# Patient Record
Sex: FEMALE | Race: WHITE
[De-identification: names, ages, dates, MRNs, and addresses within clinical notes are randomized per-mention and may not be internally consistent; named-entity substitution may affect disease eponyms.]

---

## 2018-05-29 NOTE — NUR
RN INITIAL NOTES



RECEIVED PATIENT FROM ER VIA PATIENT'S WHEELCHAIR. PATIENT IS A/OX3, ABLE TO MAKE NEEDS 
KNOWN. DIAGNOSIS OF BLE CELLULITIS. NO ACUTE DISTRESS, NO SOB NOTED. ON ROOM AIR, TOLERATED 
WELL. IV SITE INTACT AND PATENT. REFUSED TO DO SKIN ASSESSMENT AT THE MOMENT, SHE SAID 
"LATER". PATIENT REFUSED TO LAY ON THE BED, PER PATIENT SHE'S ON THE WHEELCHAIR MOST OF THE 
DAY AND ONLY TRANSFER TO BED DURING AT NIGHT. WILL NOTIFY DIONE GONZALEZ NP FOR ADMITTING 
ORDERS. WILL CONTINUE TO MONITOR ACCORDINGLY.

## 2018-05-29 NOTE — NUR
RN NOTES

RECEIVED PATIENT UP IN WHEELCHAIR. AO X 3, ABLE TO MAKE NEEDS KNOWN. NO ACUTE DISTRESS 
NOTED. MONITORED FOR PAIN. IV SITE PATENT, INTACT; FLUSHED. SAFETY REMINDERS GIVEN. CALL 
BELL WITHIN EASY REACH. WILL CONTINUE TO MONITOR.

## 2018-05-29 NOTE — NUR
RN CLOSING NOTES



PATIENT IN STABLE CONDITION. ALL NEEDS ATTENDED AND PROVIDED. KEPT PATIENT SAFE. PATIENT 
REFUSED TO LAY ON BED. PATIENT NOTED SITTING ON WHEELCHAIR UPON CHANGE OF SHIFT. ENDORSED TO 
NIGHT RN FOR BRONWYN.

## 2018-05-29 NOTE — NUR
RN NOTES



PATIENT KEPT ASKING FOR A SMOKE. PER PATIENT, DIONE GONZALEZ NP SAID SHE CAN SMOKE.

CONSENT FOR SMOKE POLICY SIGNED.

WENT DOWN FOR A SMOKE WITH CORKY ANGEL.

## 2018-05-30 NOTE — NUR
RN CLOSING NOTES



PT IS STABLE AND RESTING AT BEDSIDE IN WHEELCHAIR. A/OX3. NO S/S OF RESP DISTRESS OR SOB. PT 
WITH CONTINUED PAIN MANAGEMENT.  IV ACCESS PATENT AND INTACT NO REDNESS OR INFILTRATION 
NOTED, CURRENTLY SL. SAFETY MEASURES IN PLACE, CALL LIGHT WITHIN REACH. WILL CONTINUE TO 
MONITOR AND ENDORSE TO NEXT SHIFT FOR CONTINUITY OF CARE

## 2018-05-30 NOTE — NUR
WOUND CARE CONSULT: PT PRESENTS WITH RASH TO PERINEUM AND LOWER BUTTOCKS, AS WELL AS REDNESS 
WITH WEEPING EDEMA TO LOWER LEGS WITH REDNESS, PRESENT ON ADMISSION. ALL SKIN PROTECTION 
RECOMMENDATIONS DISCUSSED WITH NURSING STAFF. WILL SEE PRN. MD IN AGREEMENT WITH PLAN OF 
CARE. 

-------------------------------------------------------------------------------

Addendum: 05/30/18 at 1211 by TIP JOSEPH WNDNU

-------------------------------------------------------------------------------

Amended: Links added.

## 2018-05-30 NOTE — NUR
PATIENT REFUSED TO GO TO BED. PER PATIENT, SHE IS MORE COMFORTABLE IN WHEELCHAIR; SHE 
USUALLY RESTS IN WHEELCHAIR WHEN IN SNF. WILL CONTINUE TO MONITOR.

## 2018-05-30 NOTE — NUR
RN NOTES

PATIENT ASLEEP, EASILY AROUSABLE. RESPIRATIONS EVEN. NO SIGNS OF PAIN NOTED. DUE MEDS GIVEN 
WITH NO ASE NOTED. NEEDS ATTENDED. KEPT CLEAN, DRY, AND COMFORTABLE. SAFETY PRECAUTIONS AND 
COMFORT MEASURES IN PLACE. WILL GIVE REPORT TO DAY SHIFT FOR CONTINUITY OF CARE.

## 2018-05-30 NOTE — NUR
RN OPENING NOTES



RECEIVED PT. PT IS STABLE AND RESTING BEDSIDE IN WHEELCHAIR. A/OX3. NO S/S OF RESP DISTRESS 
OR SOB. PT C/O PAIN 6/10 ORIGINATING FROM LUE. WILL ADDRESS PHARMACOLOGICALLY. IV ACCESS 
LOCATED ON RAC 22G, CURRENTLY SL. SAFETY MEASURES IN PLACE, CALL LIGHT WITHIN REACH. WILL 
CONTINUE TO MONITOR.

## 2018-05-30 NOTE — NUR
RECEIVED PATIENT UP IN WHEELCHAIR. AO X 3, ABLE TO MAKE NEEDS KNOWN. NO ACUTE DISTRESS 
NOTED. MONITORED FOR PAIN. IV SITE PATENT, INTACT; FLUSHED. SAFETY REMINDERS GIVEN. CALL 
BELL WITHIN EASY REACH. WILL CONTINUE TO MONITOR.

## 2018-05-31 NOTE — NUR
MS RN OPENING NOTE

PATIENT IS RESTING COMFORTABLY IN WHEELCHAIR AS REQUESTED. ALERT AND ORIENTED X4. NO FACIAL 
GRIMACING NOTED FOR PAIN. NO SOB OR DISTRESS NOTED. CALL LIGHT WITHIN REACH. SAFETY MEASURES 
IMPLEMENTED. ABLE TO COMMUNICATE NEEDS. IV INTACT AND PATENT NO REDNESS OR SWELLING NOTED, 
NO FLUIDS AT THIS TIME. WILL CONTINUE TO MONITOR THROUGHOUT SHIFT.

## 2018-05-31 NOTE — NUR
MS RN NOTE

PATIENT STATED SHE IS VERY ANXIOUS ABOUT NOT SMOKING, SHE SAID " THIS ISN'T FAIR, I JUST 
WANT TO SMOKE". PATIENT SEEN CRYING AND MOANING. ATIVAN 1MG PO GIVEN. WILL CONTINUE TO 
MONITOR PATIENT

## 2018-05-31 NOTE — NUR
MS RN NOTE

ATIVAN 1 MG EFFECTIVE. PATIENT RESTING AT THIS TIME. NO ANXIOUSNESS NOTED, WILL CONTINUE TO 
MONITOR THROUGHOUT SHIFT

## 2018-05-31 NOTE — NUR
MS RN NOTE

PATIENT REQUESTED PAIN MEDICATION FOR PAIN 10/10 ON BILATERAL LOWER EXTREMITIES. NORCO 
10/325 PO GIVEN. WILL REASSESS PAIN

## 2018-05-31 NOTE — NUR
PATIENT AWAKE; TRANSFERRED TO WHEELCHAIR AS REQUESTED BY PATIENT. RESPIRATIONS EVEN. NO 
SIGNS OF PAIN NOTED. DUE MEDS GIVEN WITH NO ASE NOTED. NEEDS ATTENDED. KEPT CLEAN, DRY, AND 
COMFORTABLE. SAFETY PRECAUTIONS AND COMFORT MEASURES IN PLACE. WILL GIVE REPORT TO DAY SHIFT 
FOR CONTINUITY OF CARE.

## 2018-05-31 NOTE — NUR
MS RN CLOSING NOTE

PATIENT IS RESTING IN WHEELCHAIR AT THIS TIME WITH BRAKES ON. NO PAIN AT THIS TIME. NO SOB 
OR DISTRESS NOTED. CALL LIGHT WITHIN REACH AT ALL TIMES. SAFETY MEASURES IMPLEMENTED. ABLE 
TO COMMUNICATE NEEDS. IV INTACT AND PATENT NO REDNESS OR SWELLING NOTED. ALL DUE MEDICATIONS 
GIVEN AS ORDERED. ALL NURSING CARE NEEDS ATTENDED TO. WOUND TREATMENT DONE THROUGHOUT SHIFT. 
WILL ENDORSE TO NIGHT SHIFT FOR BRONWYN

## 2018-05-31 NOTE — NUR
MS RN INITIAL NOTE 



PT IS RESTING COMFORTABLY IN THE WHEELCHAIR, PT DOES NOT TO WANT TO BE IN THE BED. A/O X4 
ABLE TO MAKE NEEDS KNOW, PT IS REQUESTING A SMOKE BREAK. REMINDED PT THAT SHE'S ALLOWED ONE 
SMOKE BREAK PER SHIFT. NO SIGNS OF SOB OR DISTRESS, BREATHING EVENLY AND UNLABORED ON RA. IV 
ACCESS IS INTACT AND PATENT. WHEELCHAIR IS IN LOCKED POSITION. WILL CONTINUE TO MONITOR PT

## 2018-06-01 NOTE — NUR
RN NOTES

PATIENT REFUSED TO GO BACK TO BED THROUGHOUT THE SHIFT, PREFERS TO STAY IN THE WHEELCHAIR. 
PATIENT A/OX3, NONCOMPLIANT. RECEIVED DISCHARGE INSTRUCTIONS AND VERBALIZED UNDERSTANDING, 
PATIENT SIGNED DISCHARGE PAPERWORKS. PATIENT ASSISTED WITH ADLS, SKIN CARE RENDERED. PATIENT 
STILL REFUSING TO HAVE PHOTOS OF APOLINAR-AREA. PATIENT IS IN NO DISTRESS, ALL DUE MEDICATIONS 
GIVEN. WAITING FOR TRANSPORTATION AT THIS TIME.

## 2018-06-01 NOTE — NUR
RN NOTES

PATIENTS SKIN ASSESSMENT DONE, PHOTOS TAKEN AND PLACED IN CHART, EXCEPT FOR APOLINAR-AREA PHOTO, 
PATIENT IS REFUSING TO GO BACK TO BED AND TO HAVE PHOTO TAKEN OF HER APOLINAR-AREA WHERE REDNESS 
ARE MORE VISIBLE. ONLY PART OF SACRAL WERE TAKEN. EXPLAINED RISKS AND BENEFITS, STILL 
REFUSED. PER PATIENT, "I CANNOT GO TO BED"

## 2018-06-01 NOTE — NUR
RN DISCHARGE

PATIENT'S SKIN CARE RENDERED, PATIENT FINALLY ALLOWED TO HAVE PHOTOS TAKEN ON HER APOLINAR-AREA, 
STILL NOTED WITH REDNESS AND RASHES. PATIENT RECEIVED DISCHARGE INSTRUCTIONS AND VERBALIZED 
UNDERSTANDING, SIGNED DISCHARGED PAPERWORKS. BELONGINGS RECONCILED. PIV LEFT IN PLACED D/T 
PATIENT CONTINUING THE ANTIBIOTICS AT SNF. PATIENT'S NEEDS ATTENDED, WOUND TREATMENT 
RENDERED, PATIENT LEFT VIA GURNEY IN NO DISTRESS. ATTEMPTED TO GIVE REPORT TO [facility] BUT NO ANSWER AT THIS TIME.

## 2018-06-01 NOTE — NUR
RN NOTES

CALLED [facility] TWICE, WAS PLACED ON HOLD TWICE FOR 30 MINUTES EACH TIME. AND 
ANOTHER ATTEMPT TWICE, WITH NO ANSWER AT ALL, JUST AN AUTOMATED VOICE. ERICH  
MADE AWARE.

## 2018-08-09 NOTE — NUR
BIB [facility] FOR BILATERAL LOWER EXTREMITY EDEMA AND REDNESS. NAD WILL CONT 
TO MONITOR.PT REFUSED TO LAY IN THE BED. normal...

## 2019-04-19 NOTE — NUR
PT FERNANDA TAVERAS FROM [facility] FOR "AMS X 2 DAYS" PT AO TO NAME PERIODS OF 
CONFUSION, PER PA PT HAS RECENT HX OF UTI AND NOT COMPLIANT WITH MEDICATION. RR 
EVEN AND UNLABORED. NO NVD AT THIS TIME. PT NOTED WITH LOWER LEG REDNESS. DR. ORTIZ AT BEDSIDE FOR EVAL

## 2019-04-20 NOTE — NUR
RN NOTES





SPOKE TO DR SNOW PRESENT ON THE UNIT. INFORMED PT CAME LAST NIGHT ON TELE AND RN RECEIVED 
PT ON NPO THEN EARLIER AROUNG 0845, PT D/C TELE TO MS. RN VERIFIED PT'S DIET WITH MD. MD 
WILL SEE PT FIRST AND WILL PUT IN ORDERS. PER MD SK

## 2019-04-20 NOTE — NUR
MS RN CLOSING NOTES





PT REMAINS IN BED, AWAKE, INTERMITTENTLY CRYING. A/O X1. TOLERATING RA, WITH NO ACUTE 
RESPIRATORY DISTRESS NOTED. PT DENIES ANY PAIN. SEEN BY SK THIS MORNING WITH ORDERS BUT PT 
REFUSED ECHOCARDIOGRAM AND COUPLE XRAYS, MD MADE AWARE. ALL NEEDS AND CARE PROVIDED. 
REPOSITIONING Q2 HOURS MADE.  RFA G20 SL, FLUSHED WITH NS, INTACT AND OPERATIONAL. PT KEPT 
COMFORTABLE.  PT'S BED IN LOWEST, LOCKED POSITION WITH SR X2. CALL LIGHT KEPT WITHIN REACH. 
WILL ENDORSE TO NIGHT NURSE FOR BRONWYN.

## 2019-04-20 NOTE — NUR
TELE RN CLOSING NOTES



Patient asleep at this time, on RA, no SOB/respiratory distress noted. Medicated for pain on 
L hip and BLE, noted effective. Patient was noted with good sleep throughout the shift. 
Remained on NPO except meds. Patient remained agitated and screaming when repositioned.  
With patent peripheral IV line RFA #20, SL. All nursing needs attended. Kept clean, dry and 
comfortable. Call light within easy reach. Afebrile the whole shift. Endorsed to the next 
shift. 

-------------------------------------------------------------------------------

Addendum: 04/20/19 at 0655 by SHAYY LIMON RN

-------------------------------------------------------------------------------

ON TELE WITH SR NOTED.

## 2019-04-20 NOTE — NUR
MS RN NOTES





SEEN BY DR. SNOW. PT AWARE OF SKIN ISSUES/BLE CELLULITIS. TO ORDER PODIATRY CONSULT WITH 
DR. WALLER. MD ANAYA TEXTED DR. WALLER AND NOTIFIED.

## 2019-04-20 NOTE — NUR
TELE RN ADMISSION NOTES



Received patient from ER via [facility] assisted by 2 ER staff. Admitted to Tele 323-1 due to 
AMS. Transferred patient to bed comfortably. Admission routine done. Initial skin assessment 
done, photos taken and documented. Kept patient clean, dry and comfortably. With patent 
peripheral IV line RFA G#20, SL. On RA, no SOB/respiratory distress noted. On tele monitor 
with SR noted. Patient denies chest pain, with complaint of pain on L hip and BLE. With 
cellulitis BLE, wound care done accordingly. Administered pain meds as ordered. Patient's 
current LOC is responsive, alert with episodes of forgetfulness and confusions. Mildly 
anxious. All nursing needs attend. Kept on NPO, expect meds as ordered. Assisted patient to 
her preferred comfortable position. Call light within easy reach. Will continue to monitor 
accordingly.

## 2019-04-20 NOTE — NUR
TELE RN OPENING NOTES





RECEIVED PT IN BED, AWAKE, INTERMITTENTLY CRYING. A/O X1. TOLERATING RA, WITH NO ACUTE 
RESPIRATORY DISTRESS NOTED. PT DENIES ANY PAIN. CONCERN WHEN CAN SHE TAKE FOOD/BREAKFAST; 
AWAITING FOR HOSPITALIST ROUNDS TO F/U DIET AND PLAN, PT MADE AWARE. PT ON TELEMONITORING 
NSR, HR 79. RFA G20 SL, FLUSHED WITH NS, INTACT AND OPERATIONAL. PT KEPT COMFORTABLE. PT'S 
BED IN LOWEST, LOCKED POSITION WITH SR X2. CALL LIGHT KEPT WITHIN REACH. WILL CONTINUE PLAN 
OF CARE.

## 2019-04-20 NOTE — NUR
MS RN OPENING NOTES



Patient asleep with HOB elevated. On RA, no SOB/respiratory distress noted. With patent 
peripheral IV line RFA G# 20 SL. Patient noted A/O x 1-2 with confusion and forgetfulness 
noted. Patient easily agitated and screaming. Patient preferred to be in R lateral position, 
with complaint of L hip pain aggravated by movement. With BLE cellulitis, swelling decreased 
compared to admission. On IV ATB as ordered. Medicated for pain as ordered. Kept bed low and 
locked, siderails x2 up, call light within easy reach. Will continue to monitor accordingly.

## 2019-04-21 NOTE — NUR
MS RN OPENING NOTES





RECEIVED PT IN BED, INTERMITTENTLY DOZING OFF. A/O X1. TOLERATING RA, WITH NO ACUTE 
RESPIRATORY DISTRESS NOTED. PT DENIES ANY PAIN AT THIS MOMENT. NIGHT NURSE ENDORSED PT 
DIDN'T HAD ENOUGHT SLEEP LAST NIGHT AND MOSTLY AWAKE IN HIS SHIFT, PT JUST FELL ASLEEP FEW 
HOURS AGO. PIV TO RFA G20 SL, FLUSHED WITH NS, INTACT AND OPERATIONAL. PT KEPT COMFORTABLE. 
PT'S BED IN LOWEST, LOCKED POSITION WITH SR X2. CALL LIGHT KEPT WITHIN REACH. WILL CONTINUE 
PLAN OF CARE.

## 2019-04-21 NOTE — NUR
RN MS OPENING NOTES

RECEIVED PATIENT IN BED AWAKE, ALERT AND ORIENTED X1, CONFUSED. VERBALLY RESPONSIVE. GETS 
AGITATED QUICKLY WHEN ASKED QUESTIONS. NONAMBULATORY.  BREATHING EVEN AND UNLABORED. NO SOB 
NOTED. TOLERATING ROOM AIR. NO COMPLAINTS OF PAIN OR DISCOMFORT. NO FACIAL GRIMACING. IV ON 
RIGHT FOREARM INTACT AND PATENT. SKIN DRY AND WARM TO TOUCH. NOTED WITH RIGHT HAND AND BLE 
EDEMA. AFEBRILE. ALL OTHER NEEDS MET. SAFETY MEASURES IN PLACE. CALL LIGHT WITHIN REACH. 
WILL CONTINUE TO MONITOR.

## 2019-04-21 NOTE — NUR
MS RN CLOSING NOTES



Patient noted asleep on bed at this time, BLE elevated to comfortable position. On RA, no 
SOB/respiratory distress noted. Afebrile the whole shift. Medicated for pain as ordered, 
noted effective. All due meds given as ordered, no ASE noted. All nursing needs attended. 
Kept patient clean, dry and comfortable at all times, snacks provided as requested. Kept bed 
low and locked, siderails x2 up, call light within easy reach. Endorsed to the next shift.

## 2019-04-21 NOTE — NUR
RN NOTES





PT SEEN AND EVALUATED BY DR. SCHROEDER/ PSYCH CONSULT. MODIFIED RISPERDAL AND GABAPENTIN 
ORDERS. WILL CONTINUE TO MONITOR.

## 2019-04-21 NOTE — NUR
MS RN CLOSING NOTES



PT REMAINS IN BED, INTERMITTENTLY DOZING OFF. A/O X1. TOLERATING RA, WITH NO ACUTE 
RESPIRATORY DISTRESS NOTED. PT HAD GENERALIZED PAIN AND JUST REQUESTED FOR NORCO, PRN GIVEN 
AS ORDERED. HOB KEPT ELEVATED. PIV TO RFA G20 SL, FLUSHED WITH NS, INTACT AND OPERATIONAL.  
ALL NEEDS AND CARE PROVIDED AND ATTENDED. PT REFUSED FOR WOUND DRESSINGS TO BE CHANGED THIS 
MORNING. MD ANAYA AWARE AND PRESENT BEDSIDE AS WELL. PT KEPT COMFORTABLE. PT'S BED IN LOWEST, 
LOCKED POSITION WITH SR X2. CALL LIGHT KEPT WITHIN REACH. WILL ENDORSE TO NIGHT SHIFT NURSE 
FOR BRONWYN.

## 2019-04-21 NOTE — NUR
RN MS NOTES 

PATIENT'S RIGHT HAND/ARM NOTED WITH NONPITTING EDEMA. WRAPPED IN ACE BANDAGE. PATIENT ALSO 
HAS IV ON THE SAME LOCATION. IV SITE IS FLUSHING WELL BUT ATTEMPTED TO CHECK TO SEE IN 
THERE'S ANY INFILTRATION. PATIENT REFUSED TO BE TOUCHED. INFORMED PATIENT THAT I WOULD JUST 
LIKE TO MAKE SURE IT'S WORKING. PER PATIENT "IT WORKS. I DONT NEED IT CHECKED. GO AWAY". 
WILL CHECK AGAIN ANOTHER TIME. WILL CONTINUE TO MONITOR FOR NOW.

## 2019-04-21 NOTE — NUR
RN MS NOTES

PATIENT REFUSED TO HAVE VITAL SIGNS TAKEN. RN AND CNA IN ROOM AND ENCOURAGING PATIENT TO 
HAVE VITALS TAKEN FOR MONITORING. PATIENT KEPT YELLING, AND TELLING RN AND CNA NOT TO TOUCH 
HER AND TO LEAVE THE ROOM. PATIENT ALSO KEEPS PRESSING CALL LIGHT AND SAYING "AMBULANCE." 
REORIENTED THE PATIENT MULTIPLE TIMES THAT SHE'S IN THE HOSPITAL BUT STILL DOESN'T STOP. RN 
AND CNA LEFT THE ROOM. PATIENT STOPPED YELLING. WILL CONTINUE TO MONITOR CLOSELY.

## 2019-04-22 NOTE — NUR
RN MS CLOSING NOTES

PATIENT IN BED. STILL YELLING OUT FOR PARAMEDICS. REMAINS CONFUSED. REORIENTED MULTIPLE 
TIMES BUT DID NOT WORK . GETS AGITATED ASKED QUESTIONS. OTHERWISE NO ACUTE CHANGES 
THROUGHOUT SHIFT. BREATHING EVEN AND UNLABORED. NO SOB NOTED. TOLERATING ROOM AIR. NO 
COMPLAINTS OF PAIN OR DISCOMFORT. NO FACIAL GRIMACING. IV ON RIGHT FOREARM INTACT. SKIN DRY 
AND WARM TO TOUCH. AFEBRILE. ALL OTHER NEEDS MET. KEPT CLEAN AND DRY. MULTIPLE ATTEMPTS TO 
REPOSITION THROUGHOUT NIGHT BUT REFUSED EVERY TIME. SAFETY MEASURES IN PLACE. CALL LIGHT 
WITHIN REACH. WILL ENDORSE TO ONCOMING NURSE FOR BRONWYN.

## 2019-04-22 NOTE — NUR
RN MS NOTES

PATIENT CONSISTENTLY PRESSES CALL [facility] AND ASKS FOR "PARAMEDICS." REORIENTED PATIENT 
MANY TIMES BUT DOES NOT WORK. PATIENT KEEPS STATING THAT SHES UNCOMFORTABLE BUT REFUSES TO 
BE TOUCHED AND REPOSITIONED. PATIENT REQUESTED FOR TYLENOL AND TYLENOL WAS GIVEN BUT FORGETS 
THAT SHE DRANK IT. WHENEVER RN GOES IN THE ROOM, PATIENT KEEPS SAYING TO "GET OUT" OR "I 
DONT LIKE YOU." TRIED TO HAVE A DIFFERENT RN BUT SAYS THE SAME THING. WILL CONTINUE TO 
REORIENT AS MUCH AS POSSIBLE.

## 2019-04-22 NOTE — NUR
RN MS NOTES

PATIENT REFUSED TO HAVE BLOOD DRAWN DESPITE EXPLANATION OF RISKS AND BENEFITS. PATIENT IS 
CONFUSED AND KEEPS CALLING FOR "PARAMEDICS" TO PICK HER UP.

## 2019-04-22 NOTE — NUR
MS RN OPENING NOTES



RECEIVED PT AWAKE IN BED WATCHING TV. HOB ELEVATED. A/O X 2-3. ABLE TO VERBALIZE NEEDS, 
DENIES PAIN OR DISCOMFORTS AT THIS TIME. CONFUSED AND EASILY GETS AGITATED. REORIENTED. ON 
ROOM AIR, BREATHING EVEN AND UNLABORED, NO SOB NOTED. IV ACCESS ON RIGHT FA G#20 INTACT AND 
PATENT. SAFETY MEASURES IN PLACE. BED IN LOW LOCKED POSITION WITH SR UP X2. CALL LIGHT IN 
REACH. WILL CONTINUE TO MONITOR PT ACCORDINGLY.

## 2019-04-22 NOTE — NUR
MSRN

FULLY AWAKE, CONFUSED AS OF THIS TIME, COOPERATIVE, NO SOB.  DENIES ANY DISCOMFORTS AS OF 
THIS TIME, REPOSITIONED BOTH LOWER EXTREMITIES PER PATIENTS' COMFORT.  REALITY ORIENTAION, 
LISTENS ONLY FOR SHORT PERIOD OF TIME.  NEEDS CLOSER OBSERVATION, FREQUENT CHECKS.  NO OTHER 
NEEDS MADE, KEPT COMFORTABLE.

## 2019-04-22 NOTE — NUR
RN MS NOTES

PATIENT REFUSED TO HAVE IV SITE CHECKED AGAIN. EXPLAINED RISKS AND BENEFITS BUT STILL 
REFUSED. PATIENT DOES NOT WANT ME TO REMOVE ACE BANDAGE. 

WILL CONTINUE TO MONITOR.

## 2019-04-22 NOTE — NUR
MS RN CLOSING NOTES



PATIENT IN BED AWAKE AND WATCHING TV AT THIS TIME. HOB ELEVATED. A/O X 2-3. ABLE TO 
VERBALIZE NEEDS. PT NOTED WITH PERIODS OF CONFUSION AND AGITATION DURING THE DAY. REORIENTED 
AS NEEDED AND NEEDS ATTENDED WELL. ON ROOM AIR, BREATHING EVEN WITH NO SOB NOTED. IV ACCESS 
ON RIGHT FA G#20 INTACT AND PATENT, EASILY FLUSHED WITH NS. PT TURNED AND REPOSITIONED Q 
2HRS. KEPT CLEAN, DRY AND COMFORTABLE. ALL SAFETY MEASURES KEPT IN PLACE. BED IN LOW LOCKED 
POSITION WITH SR UP X2. CALL LIGHT IN REACH. WILL ENDORSE TO NIGHT SHIFT NURSE FOR BRONWYN.

## 2019-04-22 NOTE — NUR
RN NOTES



PATIENT COMPLAINED OF PAIN ON HER LEFT HIP AND REQUESTED FOR TYLENOL. PRN TYLENOL 650 MG PO 
ADMINISTERED AT 1555. WILL CONTINUE TO MONITOR PT.

## 2019-04-23 NOTE — NUR
RN NOTES

PATIENT A/OX1-2, EASILY AGITATED, BREATHING EVEN AND UNLABORED, BLE COVERED WITH KERLIX, 
C/D/I. PATIENT REFUSED TO HAVE FEET TOUCHED AT THIS TIME, WILL OFFER TO CHANGE DRESSING AT A 
LATER TIME. NEEDS ATTENDED, CALL LIGHT WITHIN REACH, WILL CONTINUE TO MONITOR.

## 2019-04-23 NOTE — NUR
RN NOTES

PATIENT REFUSING TO EAT AT THIS TIME AND TO TAKE HER MEDICATIONS. EXPLAINED RISKS AND 
BENEFITS, STILL REFUSED. VERBALIZED UNDERSTANDING.

## 2019-04-23 NOTE — NUR
RN DISCHARGE NOTE

PATIENT A/OX1-2, BREATHING EVEN AND UNLABORED, NO SOB NOTED, PATIENT ALLOWED SKIN CARE, 
HOWEVER STILL REFUSING PHOTOS. BUTTOCKS STILL NOTED WITH REDNESS, WOUND TREATMENT RENDERED. 
AMBULANCE CAME AND REPORT GIVEN, VSS. PIV REMOVED AND APPLIED GAUZE AND TAPE. BELONGINGS 
RECONCILED AND COMPLETE. PATIENT LEFT IN STABLE CONDITION VIA AMBULANCE. 

-------------------------------------------------------------------------------

Addendum: 04/23/19 at 1442 by MARIA LUISA MCCONNELL RN

-------------------------------------------------------------------------------

ADDENDUM: 

DISCHARGE INSTRUCTIONS PROVIDED, BUT PATIENT REFUSED TO SIGN, WITNESSED BY ANOTHER NURSE.

## 2019-04-23 NOTE — NUR
RN NOTES

REPORT GIVEN TO JENNIE EARL AT [facility], DISCHARGE INSTRUCTIONS PROVIDED AND VERBALIZED 
UNDERSTANDING. AMBULANCE PRESENT. WILL GIVE REPORT.

## 2019-04-23 NOTE — NUR
RN NOTES

PATIENT REFUSED TO HAVE SKIN ASSESSMENT AND PHOTOS TO BE TAKEN, PATIENT STATED "NO, YOU MAY 
NOT." OFFERED WOUND TREATMENT ON BLE, PATIENT ALSO REFUSED, PATIENT STATED, "DO NOT TOUCH MY 
LEGS" EXPLAINED RISKS AND BENEFITS, STILL REFUSED, PATIENT IS EASILY AGITATED.

## 2019-04-23 NOTE — NUR
WOUND CARE CONSULT   WOUND CARE RECEIVED CONSULT FOR FOOT WOUNDS.  WOUND CARE WILL DEFER 
CONSULT AND ALL TREATMENT PLANS TO PLASTIC SURGICAL TEAM INCLUDING DPM DR WALLER WHO ARE 
ALL FOLLOWING THIS PATIENT.  PATIENT WITH MARY ANN AT 15, ALL PRESSURE ULCER PREVENTION 
MEASURES ARE NOTED TO BE IN PLACE.  WILL SEE PRN.

## 2020-01-15 NOTE — NUR
MS RN NOTE



LAB HERE TO DRAW BLOOD CULTURE, WILL ADMIN ROCEPHIN AFTER VERIFIED BY PHARMMyla CAMPBELL CHARGE 
MADE AWARE OF NEED FROM PYXIS.

## 2020-01-15 NOTE — NUR
MS RN NOTE



PT REFUSED BLOOD CX TO BE DRAWN, RISKS AND BENEFITS MADE AWARE. WILL CONT. TO MONITOR.

## 2020-01-15 NOTE — NUR
MS RN NOTE



PT ARRIVED TO FLOOR VIA [facility] ACCOMPANIED BY ER STAFF. PT A/O X4, NO SIGNS OF SOB OR 
DISTRESS, NO N/V. PT REFUSING BODY ASSESSMENT TO BE DONE, ALL BELONGINGS ACCOUNTED AND 
SIGNED FOR. NOTED WITH IV IN R HAND #20 S/L. ALL CURRENT NEEDS ATTENDED TO. BED LOW, LOCKED, 
UPPER RAILS UP, AND CALL LIGHT WITHIN REACH. WILL CONT. TO MONITOR. GONZALEZ AWARE OF PT 
ARRIVAL TO FLOOR.

## 2020-01-16 NOTE — NUR
MS RN NOTE 



CALLED ON CALL MD TO NOTIFY THAT THE PATIENT O2 SAT IS 87%- 88%. PATIENT IS CURRENTLY ON 
OXYGEN, VERY CONGESTED, SPUTUM IS CLEAR. HX OF COPD. MD TELEPHONE ORDERED STAT CXR AND STAT 
ABG. READ BACK, NOTED AND CARRIED OUT.

## 2020-01-16 NOTE — NUR
MS RN NOTES



PATIENT SEEN BY MANUELA MACK, INFORMED MANUELA THAT PATIENT HAS BEEN OFF OF DEXTROAMPHETAMINE 
SINCE NOV 1ST WITH ORDER FOR DC MED. CARRIED OUT.

## 2020-01-16 NOTE — NUR
MS RN NOTE



SPOKE WITH ANITA AT PHARMACY IN REGARDS TO ROCEPHIN ORDER NOT BEING VERIFIED, AWAITING 
VERIFICATION

## 2020-01-16 NOTE — NUR
MS RN NOTE



PT NOTED WITH LACTIC ACID OF 3.1, GONZALEZ NOTIFIED WITH NEW ORDER FOR  ML BOLUS, AND 
REDRAW OR LACTIC AFTER BOLUS GIVEN. NEW ORDER CARRIED OUT.

## 2020-01-16 NOTE — NUR
MS RN CLOSING NOTES



ALERT AND AWAKE ORIENTED X4. NO SOB. DENIES ANY C/O PAIN NOR DISCOMFORT AT THIS TIME. HOB 
ELEVATED. ON O2 VIA NC @ 2L/MIN DARIEN WELL. RIGHT HAND #20 INTACT AND PATENT. PATIENT NON 
COMPLIANT WITH CARE DESPITE OF RISKS AND BENEFITS EXPLAINED. BED IN LOWEST POSITION, LOCKED. 
BED ALARM ON. BED SIDERAILS UP X2. CALL LIGHT WITHIN REACH. IN NO APPARENT DISTRESS.

## 2020-01-16 NOTE — NUR
MS RN NOTE



PT REMAINS IN STABLE CONDITION, A/O X4, NO SIGNS OF SOB OR DISTRESS, NO N/V. PT ALLOWED 
PARTIAL BODY ASSESSMENT TO BE DONE PHOTOS TAKEN AS PATIENT ALLOWED. NOTED WITH IV IN R HAND 
#20 S/L. ALL CURRENT NEEDS ATTENDED TO. BED LOW, LOCKED, UPPER RAILS UP, AND CALL LIGHT 
WITHIN REACH. WILL CONT. TO MONITOR AND ENDORSE TO NEXT SHIFT FOR BRONWYN.

## 2020-01-16 NOTE — NUR
MS RN NOTE



PT REFUSED FOR PILLOWS TO BE TAKEN OFF OF LEGS. DAILY INACCURATE. WILL ENDORSE TO NEXT SHIFT 
FOR BRONWYN.

## 2020-01-16 NOTE — NUR
MS RN NOTE



PT REFUSED AM LABS, RISKS AND BENEFITS MADE AWARE, WITH VERBALIZATION OF UNDERSTANDING.

## 2020-01-16 NOTE — NUR
MS RN OPENING NOTE 



RECEIVED PATIENT IN BED. A/O X4. ON OXYGEN 2L/MIN VIA NASAL CANNULA. RESPIRATIONS ARE EVEN 
AND UNLABORED. NO S/ SOB NOTED. DENIES PAIN AT THIS TIME. IN NO APPARENT DISTRESS. IV ACCESS 
RIGHT HAND #20 PATENT AND SALINE LOCKED. BED IS LOW AND LOCKED, HOB ELEVATED IN SEMI FOWLERS 
POSITION, SIDE RAILS UPX2, BED ALARM ON. CALL LIGHT WITHIN REACH. WILL CONTINUE TO MONITOR.

## 2020-01-16 NOTE — NUR
MS RN OPENING NOTES



RECEIVED PATIENT IN BED, ALERT AND AWAKE. NO S/S OF RESPIRATORY DISTRESS. DENIES ANY C/O 
PAIN NOR DISCOMFORT AT THIS TIME. HOB ELEVATED. ON O2 V/A NC DARIEN WELL. RIGHT HAND #20 INTACT 
AND PATENT. BED IN LOWEST POSITION, LOCKED. BED ALARM ON. BED SIDERAILS UP X2. CALL LIGHT 
WITHIN REACH.

## 2020-01-16 NOTE — NUR
MS RN NOTE



0330 DOSE OF VANCOMYCIN 1.5 GM MANUALLY ADMINISTERED. PULLED FROM [facility] BY [facility]GE 
NURSE.

## 2020-01-17 NOTE — NUR
MS RN NOTE



PATIENT REFUSED VANCO ANTIBIOTIC. EXPLAINED THE RISKS AND BENEFITS PATIENTS STILL REFUSED. 
WILL CONTINUE TO MONITOR.

## 2020-01-17 NOTE — NUR
WOUND CARE CONSULT   WOUND CARE RECEIVED CONSULT FOR BLE WOUNDS.  PATIENT CONTINUES TO 
REFUSE ANY SKIN ASSESSMENTS AT THIS TIME.  PATIENT REFUSED FOR PODIATRY TEAM TO SEE OR 
EXAMINE HER AS WELL.  PER NURSING STAFF, PATIENT IS ALSO REFUSING TO BE TURNED OR 
REPOSITIONED.  PATIENT REFUSING TO HAVE LOWER EXTREMITY WRAPS REMOVED OR HER LEGS TO BE 
EXAMINED.  WILL SEE PATIENT ONCE SHE AGREES TO BE ASSESSED.

## 2020-01-17 NOTE — NUR
MS RN NOTE 



CALLED ON CALL MD TO NOTIFY THE ABG RESULTS AND THAT PATIENT REFUSED CXR. MD TELEPHONE 
ORDERED ALBUTEROL 2.5 PRNQ4 WHILE AWAKE AND ATROVENT 0.5MG PRNQ4 WHILE AWAKE, ABG FOR 
TOMORROW. ORDERS READ BACK NOTED AND CARRIED OUT.

## 2020-01-17 NOTE — NUR
MS RN CLOSING NOTES



ALERT AND AWAKE, ORIENTED X4. HOB ELEVATED. NO S/S OF RESPIRATORY DISTRESS. DENIES ANY C/O 
PAIN NOR DISCOMFORT AT THIS TIME. ON O2 @ 2L/MIN VIA NC DARIEN WELL. RIGHT HAND SL #20 INTACT 
AND PATENT. BLE ELEVATED ON PILLOWS. WOUND CARE DONE. BED SIDERAILS UP X2. BED ALARM ON. 
CALL LIGHT WITHIN REACH. IN NO APPARENT DISTRESS.

## 2020-01-17 NOTE — NUR
MS RN OPENING NOTES



RECEIVED PATIENT ALERT AND AWAKE, ORIENTED X4. HOB ELEVATED. NO SOB. ON O2 @ 2L/MIN VIA NC 
DARIEN WELL. RIGHT HAND SL #20 INTACT AND PATENT. DENIES ANY C/O PAIN NOR DISCOMFORT AT THIS 
TIME. BED SIDERAILS UP X2. BED ALARM ON. CALL LIGHT WITHIN REACH. ABLE TO VERBALIZE NEEDS.

## 2020-01-17 NOTE — NUR
RN NOTES PM SHIFT

PATIENT ALERT AND ORIENTED X4, ABLE TO VERBALIZE NEEDS, ON 5LPM VIA NC, SPO2 93%, NOT IN 
APPARENT DISTRESS, CALM AT THIS TIME, BLE OFFLOADED, DRESSING DRY, CLEAN AND INTACT, KEPT 
SAFE, WILL CONTINUE TO MONITOR.

## 2020-01-17 NOTE — NUR
MS RN CLOSING NOTE 



PATIENT IN BED. A/O X4. REMAINS ON OXYGEN 4L/MIN VIA NASAL CANNULA. RESPIRATIONS ARE EVEN 
AND UNLABORED. NO SOB NOTED. MANAGED PAIN WITH DILAUDID AND NORCO THROUGHOUT SHIFT. NO 
DISTRESS NOTED. IV ACCESS MAINTAINED IN RIGHT HAND #20 PATENT AND SALINE LOCKED. BED REMAINS 
LOW AND LOCKED, HOB ELEVATED IN SEMI FOWLERS POSITION, SIDE RAILS UPX2, BED ALARM ON. CALL 
LIGHT WITHIN REACH. WILL ENDORSE TO NEXT SHIFT

## 2020-01-18 NOTE — NUR
MS RN NOTE



RECEIVED PT IN STABLE CONDITION A/O X4, CURRENTLY IN BED. NO SIGNS OF SOB OR DISTRESS, NO 
C/O PAIN OR N/V. IV IN L HAND #22, IN PLACE S/L. ALL CURRENT NEEDS ATTENDED TO. BED LOW, 
LOCKED, UPPER RAILS UP AND CALL LIGHT WITHIN REACH. WILL REPOSITION PER PROTOCOL AND CONT. 
TO MONITOR.

## 2020-01-18 NOTE — NUR
RN MS NOTES

PT REFUSED SCHMITT CATH INSERTION AND URINE COLLECTION VIA STRAIGHT CATH, EXPLAINED THE NEED 
FOR IT BUT STILL REFUSED.

## 2020-01-18 NOTE — NUR
RN MS NOTES

PT IN BED, AWAKE, ALERT AND ORIENTED, WITH COMPLAINT OF LEFT HIP AND LEG PAIN, PAIN 
MEDICATION GIVEN AS ORDERED, NOT IN DISTRESS, REFUSED BREATHING TREATMENT, CALL LIGHT WITHIN 
REACH.

## 2020-01-18 NOTE — NUR
RN MS NOTES

PT SEEN AND EXAMINED BY DR. HARO, PLAN OF CARE DISCUSSED WITH PT, VERBALIZED 
UNDERSTANDING, REFUSED TURNING AND REPOSITIONING AT THIS TIME, EXPLAINED PT RISKS BUT STILL 
REFUSED.

## 2020-01-18 NOTE — NUR
RN NOTES PM SHIFT

PATIENT ALERT AND ORIENTED X3, ON 5LPM VIA NC, COMPLAINING OF PAIN DURING REPOSITIONING AND 
DIAPER CHANGE, LEFT HIP FRACTURE, BLE CONTRACTURE WITH VENOUS STASIS WOUND. DRESSING 
REMAINED DRY AND INTACT, HEELS OFFLOADED, ROCEPHIN AND VANCOMYCIN GIVEN DURING SHIFT, 
AWAITING TRANSFER TO ANOTHER HOSPITAL TO MANAGE LEFT HIP FRACTURE, EITHER [facility] OR [facility]

## 2020-01-19 NOTE — NUR
MS RN NOTE



DURING MORNING CARE, AFTER MULTIPLE ATTEMPTS, PT REFUSED FOR BILATERAL LOWER EXTREMITY 
DRESSINGS TO BE CHANGED. PT ALLOWED SCHMITT TO BE INSERTED AS ORDERED.

## 2020-01-19 NOTE — NUR
MS RN NOTES 



RECEIVED PATIENT ASLEEP IN BED WITH NO DISTRESS NOTED. CALL LIGHT WITHIN REACH. PERIPHERAL 
LINE INTACT AND PATENT. BED IN LOW LOCK SETTING WITH BED ALARM ON AND FUNCTIONING PROPERLY. 
ROOM FREE OF CLUTTER AND BELONGINGS KEPT NEAR BEDSIDE. WILL CONTINUE TO MONITOR.

## 2020-01-19 NOTE — NUR
CHANGE OF SHIFT REPORT



PT RESTING COMFORTABLY IN BED. NO S/S OR C/O PAIN OR DISTRESS NOTED. SIDE RAILS UP X2. CALL 
LIGHT LEFT WITHIN REACH. PT KEPT CLEAN, DRY, AND COMFORTABLE. NO SIGNIFICANT CHANGES SINCE 
PREVIOUS SHIFT. REPORT GIVEN TO BRINDA EARL.

## 2020-01-19 NOTE — NUR
MS RN NOTE



PT REMAINS IN STABLE CONDITION A/O X4, CURRENTLY IN BED. NO SIGNS OF SOB OR DISTRESS, NO C/O 
PAIN OR N/V. IV IN L HAND #22, IN PLACE S/L. ALL CURRENT NEEDS ATTENDED TO. BED LOW, LOCKED, 
UPPER RAILS UP AND CALL LIGHT WITHIN REACH. WILL CONT TO MONITOR.

## 2020-01-20 NOTE — NUR
RN OPENING NOTE



PT WAS RECEIVED IN BED AT LOWEST AND LOCKED POSITION WITH SIDE RAILS UP X2, A/O X4 BREATHING 
EVEN AND UNLABORED ON NC, NO S/S OF ANY DISTRESS OR PAIN NOTED AT THIS TIME, IV IS PATENT 
AND INTACT, INFORMED BY NIGHT RN THAT PT TENDS TO REFUSE CARE, PLAN FOR TRANSFER TO HIGHER 
LEVEL OF CARE, SAFETY PRECAUTIONS IN PLACE, CALL LIGHT IN REACH, WILL MONITOR ACCORDINGLY

## 2020-01-20 NOTE — NUR
MS RN NOTES 



PATIENT AWAKE IN BED WITH NO DISTRESS NOTED. CALL LIGHT WITHIN REACH. ALL DUE MEDS GIVEN AS 
ORDERED WITH NO ASE. WOUND CARE RENDERED AND TOLERATED WELL. PERIPHERAL LINE INTACT AND 
PATENT. BED IN LOW LOCK SETTING WITH BED ALARM ON AND FUNCTIONING PROPERLY. ROOM FREE OF 
CLUTTER AND BELONGINGS KEPT NEAR BEDSIDE. WILL ENDORSE TO ONCOMING SHIFT.

## 2020-01-20 NOTE — NUR
DISCHARGE/TRANSFER NOTE



PT WAS DISCHARGED AND TRANSFERRED AT THIS TIME TO [facility] WHERE 
REPORT WAS GIVEN TO LARON EARL. INFORMED THAT PT WILL BE GOING TO ROOM 4325. ID BAND WAS 
REMOVED. LEFT HAND GAUGE 22 IV WAS LEFT IN PLACE. PHOTOS OF SKIN IN THE CHART. ALL D/C 
PAPERWORK, EXITCARE AND BELONGINGS LIST WERE SIGNED, DISCUSSED AND HANDED TO PATIENT AND EMT 
CREW. ALL BELONGINGS WERE GIVEN TO THE EMT CREW. ALL NEEDS WERE ATTENDED TO DURING HER STAY. 
PT LEFT IN STABLE CONDITION A/O X3-4 BREATHING EVEN AND UNLABORED ON 5L WITH NO S/S OF ANY 
DISTRESS OR PAIN, VS STABLE.

## 2020-06-16 NOTE — NUR
C/O SEVERE L HIP PAIN. REFUSING XRAY AT THIS TIME. DR HERNANDEZ MADE AWARE. NORCO 
10/325 MG GIVEN PO.

## 2020-06-16 NOTE — NUR
PT REQUESTED FOR NORCO/PAIN MEDS, PER DR HERNANDEZ SHE HAS REC'D ENOUGH TODAY AND 
NOT ADVISABLE TO GET ANOTHER ONE, PT MADE AWARE, REMINDED PT THAT NORCO IS 
EVERY 4-6HRS AND SHE ALREADY REC'D DOSE 2HRS AGO

## 2020-06-16 NOTE — NUR
PT LEFT FACILTIY TO [facility]. REPORT GIVEN TO AMBULANCE STAFF. LEFT IN 
STABLE CONDITION

## 2020-10-19 ENCOUNTER — HOSPITAL ENCOUNTER (INPATIENT)
Dept: HOSPITAL 54 - ER | Age: 75
LOS: 4 days | Discharge: NURSING, REHAB OR LONG-TERM CARE | DRG: 871 | End: 2020-10-23
Attending: NURSE PRACTITIONER | Admitting: NURSE PRACTITIONER
Payer: MEDICARE

## 2020-10-19 VITALS — BODY MASS INDEX: 25.18 KG/M2 | HEIGHT: 69 IN | WEIGHT: 170 LBS

## 2020-10-19 DIAGNOSIS — G89.4: ICD-10-CM
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
## 2020-10-19 DIAGNOSIS — E87.1: ICD-10-CM

## 2020-10-19 DIAGNOSIS — N17.0: ICD-10-CM

## 2020-10-19 DIAGNOSIS — L03.115: ICD-10-CM

## 2020-10-19 DIAGNOSIS — Z96.643: ICD-10-CM

## 2020-10-19 DIAGNOSIS — N39.0: ICD-10-CM

## 2020-10-19 DIAGNOSIS — E87.6: ICD-10-CM

## 2020-10-19 DIAGNOSIS — M19.90: ICD-10-CM

## 2020-10-19 DIAGNOSIS — M62.50: ICD-10-CM

## 2020-10-19 DIAGNOSIS — E43: ICD-10-CM

## 2020-10-19 DIAGNOSIS — A49.02: ICD-10-CM

## 2020-10-19 DIAGNOSIS — D68.69: ICD-10-CM

## 2020-10-19 DIAGNOSIS — J44.9: ICD-10-CM

## 2020-10-19 DIAGNOSIS — E88.09: ICD-10-CM

## 2020-10-19 DIAGNOSIS — Z88.0: ICD-10-CM

## 2020-10-19 DIAGNOSIS — B95.2: ICD-10-CM

## 2020-10-19 DIAGNOSIS — Z74.01: ICD-10-CM

## 2020-10-19 DIAGNOSIS — B96.4: ICD-10-CM

## 2020-10-19 DIAGNOSIS — L03.116: ICD-10-CM

## 2020-10-19 DIAGNOSIS — L89.896: ICD-10-CM

## 2020-10-19 DIAGNOSIS — I87.313: ICD-10-CM

## 2020-10-19 DIAGNOSIS — I50.22: ICD-10-CM
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
## 2020-10-19 DIAGNOSIS — F90.9: ICD-10-CM

## 2020-10-19 DIAGNOSIS — E03.9: ICD-10-CM

## 2020-10-19 DIAGNOSIS — F41.9: ICD-10-CM

## 2020-10-19 DIAGNOSIS — L85.3: ICD-10-CM

## 2020-10-19 DIAGNOSIS — Z99.3: ICD-10-CM

## 2020-10-19 DIAGNOSIS — D63.8: ICD-10-CM

## 2020-10-19 DIAGNOSIS — L97.819: ICD-10-CM

## 2020-10-19 DIAGNOSIS — M81.0: ICD-10-CM

## 2020-10-19 DIAGNOSIS — L97.829: ICD-10-CM

## 2020-10-19 DIAGNOSIS — S71.002A: ICD-10-CM

## 2020-10-19 DIAGNOSIS — R74.01: ICD-10-CM

## 2020-10-19 DIAGNOSIS — M21.171: ICD-10-CM

## 2020-10-19 DIAGNOSIS — I11.0: ICD-10-CM

## 2020-10-19 DIAGNOSIS — A41.9: Primary | ICD-10-CM

## 2020-10-19 DIAGNOSIS — L89.156: ICD-10-CM

## 2020-10-19 DIAGNOSIS — Z87.891: ICD-10-CM

## 2020-10-19 DIAGNOSIS — G40.909: ICD-10-CM

## 2020-10-19 PROCEDURE — G0378 HOSPITAL OBSERVATION PER HR: HCPCS

## 2020-10-19 PROCEDURE — A6403 STERILE GAUZE>16 <= 48 SQ IN: HCPCS

## 2020-10-19 PROCEDURE — A4216 STERILE WATER/SALINE, 10 ML: HCPCS

## 2020-10-19 PROCEDURE — C9803 HOPD COVID-19 SPEC COLLECT: HCPCS

## 2020-10-19 PROCEDURE — A6253 ABSORPT DRG > 48 SQ IN W/O B: HCPCS

## 2020-10-19 PROCEDURE — A6407 PACKING STRIPS, NON-IMPREG: HCPCS

## 2020-10-20 VITALS — DIASTOLIC BLOOD PRESSURE: 56 MMHG | SYSTOLIC BLOOD PRESSURE: 104 MMHG

## 2020-10-20 VITALS — DIASTOLIC BLOOD PRESSURE: 59 MMHG | SYSTOLIC BLOOD PRESSURE: 108 MMHG

## 2020-10-20 VITALS — DIASTOLIC BLOOD PRESSURE: 68 MMHG | SYSTOLIC BLOOD PRESSURE: 127 MMHG

## 2020-10-20 VITALS — SYSTOLIC BLOOD PRESSURE: 114 MMHG | DIASTOLIC BLOOD PRESSURE: 66 MMHG

## 2020-10-20 LAB
ALBUMIN SERPL BCP-MCNC: 1.7 G/DL (ref 3.4–5)
ALBUMIN SERPL BCP-MCNC: 1.9 G/DL (ref 3.4–5)
ALP SERPL-CCNC: 118 U/L (ref 46–116)
ALP SERPL-CCNC: 141 U/L (ref 46–116)
ALT SERPL W P-5'-P-CCNC: 63 U/L (ref 12–78)
ALT SERPL W P-5'-P-CCNC: 84 U/L (ref 12–78)
APPEARANCE UR: (no result)
AST SERPL W P-5'-P-CCNC: 45 U/L (ref 15–37)
AST SERPL W P-5'-P-CCNC: 63 U/L (ref 15–37)
BASOPHILS # BLD AUTO: 0 /CMM (ref 0–0.2)
BASOPHILS # BLD AUTO: 0.2 /CMM (ref 0–0.2)
BASOPHILS NFR BLD AUTO: 0.3 % (ref 0–2)
BASOPHILS NFR BLD AUTO: 1.5 % (ref 0–2)
BASOPHILS NFR BLD MANUAL: 0 % (ref 0–2)
BILIRUB DIRECT SERPL-MCNC: 0 MG/DL (ref 0–0.2)
BILIRUB SERPL-MCNC: 0.1 MG/DL (ref 0.2–1)
BILIRUB SERPL-MCNC: < 0.1 MG/DL (ref 0.2–1)
BILIRUB UR QL STRIP: NEGATIVE
BUN SERPL-MCNC: 17 MG/DL (ref 7–18)
BUN SERPL-MCNC: 19 MG/DL (ref 7–18)
CALCIUM SERPL-MCNC: 8.9 MG/DL (ref 8.5–10.1)
CALCIUM SERPL-MCNC: 9.5 MG/DL (ref 8.5–10.1)
CHLORIDE SERPL-SCNC: 102 MMOL/L (ref 98–107)
CHLORIDE SERPL-SCNC: 103 MMOL/L (ref 98–107)
CHOLEST SERPL-MCNC: 125 MG/DL (ref ?–200)
CO2 SERPL-SCNC: 21 MMOL/L (ref 21–32)
CO2 SERPL-SCNC: 21 MMOL/L (ref 21–32)
COLOR UR: YELLOW
CREAT SERPL-MCNC: 0.7 MG/DL (ref 0.6–1.3)
CREAT SERPL-MCNC: 0.7 MG/DL (ref 0.6–1.3)
EOSINOPHIL NFR BLD AUTO: 2.8 % (ref 0–6)
EOSINOPHIL NFR BLD AUTO: 2.8 % (ref 0–6)
EOSINOPHIL NFR BLD MANUAL: 0 % (ref 0–4)
GLUCOSE SERPL-MCNC: 123 MG/DL (ref 74–106)
GLUCOSE SERPL-MCNC: 132 MG/DL (ref 74–106)
GLUCOSE UR STRIP-MCNC: NEGATIVE MG/DL
HCT VFR BLD AUTO: 27 % (ref 33–45)
HCT VFR BLD AUTO: 29 % (ref 33–45)
HDLC SERPL-MCNC: 22 MG/DL (ref 40–60)
HGB BLD-MCNC: 8.3 G/DL (ref 11.5–14.8)
HGB BLD-MCNC: 9 G/DL (ref 11.5–14.8)
HGB UR QL STRIP: (no result) ERY/UL
IRON SERPL-MCNC: 18 UG/DL (ref 50–175)
KETONES UR STRIP-MCNC: NEGATIVE MG/DL
LDLC SERPL DIRECT ASSAY-MCNC: 74 MG/DL (ref 0–99)
LEUKOCYTE ESTERASE UR QL STRIP: (no result)
LYMPHOCYTES NFR BLD AUTO: 1.6 /CMM (ref 0.8–4.8)
LYMPHOCYTES NFR BLD AUTO: 11.7 % (ref 20–44)
LYMPHOCYTES NFR BLD AUTO: 15.6 % (ref 20–44)
LYMPHOCYTES NFR BLD AUTO: 2.4 /CMM (ref 0.8–4.8)
LYMPHOCYTES NFR BLD MANUAL: 13 % (ref 16–48)
MAGNESIUM SERPL-MCNC: 2.1 MG/DL (ref 1.8–2.4)
MCHC RBC AUTO-ENTMCNC: 31 G/DL (ref 31–36)
MCHC RBC AUTO-ENTMCNC: 31 G/DL (ref 31–36)
MCV RBC AUTO: 82 FL (ref 82–100)
MCV RBC AUTO: 83 FL (ref 82–100)
MONOCYTES NFR BLD AUTO: 0.8 /CMM (ref 0.1–1.3)
MONOCYTES NFR BLD AUTO: 1 /CMM (ref 0.1–1.3)
MONOCYTES NFR BLD AUTO: 5.7 % (ref 2–12)
MONOCYTES NFR BLD AUTO: 6.1 % (ref 2–12)
MONOCYTES NFR BLD MANUAL: 1 % (ref 0–11)
NEUTROPHILS # BLD AUTO: 10.6 /CMM (ref 1.8–8.9)
NEUTROPHILS # BLD AUTO: 11.7 /CMM (ref 1.8–8.9)
NEUTROPHILS NFR BLD AUTO: 74 % (ref 43–81)
NEUTROPHILS NFR BLD AUTO: 79.5 % (ref 43–81)
NEUTS BAND NFR BLD MANUAL: 1 % (ref 0–5)
NEUTS SEG NFR BLD MANUAL: 79 % (ref 42–76)
NITRITE UR QL STRIP: POSITIVE
NT-PROBNP SERPL-MCNC: 505 PG/ML (ref 0–125)
PH UR STRIP: 7.5 [PH] (ref 5–8)
PHOSPHATE SERPL-MCNC: 3.3 MG/DL (ref 2.5–4.9)
PLATELET # BLD AUTO: 513 /CMM (ref 150–450)
PLATELET # BLD AUTO: 609 /CMM (ref 150–450)
POTASSIUM SERPL-SCNC: 3.3 MMOL/L (ref 3.5–5.1)
POTASSIUM SERPL-SCNC: 3.5 MMOL/L (ref 3.5–5.1)
PROT SERPL-MCNC: 6.6 G/DL (ref 6.4–8.2)
PROT SERPL-MCNC: 7.3 G/DL (ref 6.4–8.2)
PROT UR QL STRIP: 100 MG/DL
RBC # BLD AUTO: 3.21 MIL/UL (ref 4–5.2)
RBC # BLD AUTO: 3.54 MIL/UL (ref 4–5.2)
RBC #/AREA URNS HPF: (no result) /HPF (ref 0–2)
SODIUM SERPL-SCNC: 135 MMOL/L (ref 136–145)
SODIUM SERPL-SCNC: 135 MMOL/L (ref 136–145)
TIBC SERPL-MCNC: 127 UG/DL (ref 250–450)
TRIGL SERPL-MCNC: 193 MG/DL (ref 30–150)
TSH SERPL DL<=0.005 MIU/L-ACNC: 7.91 UIU/ML (ref 0.36–3.74)
UROBILINOGEN UR STRIP-MCNC: 0.2 EU/DL
VARIANT LYMPHS NFR BLD MANUAL: 6 % (ref 0–0)
WBC #/AREA URNS HPF: (no result) /HPF (ref 0–3)
WBC NRBC COR # BLD AUTO: 13.3 K/UL (ref 4.3–11)
WBC NRBC COR # BLD AUTO: 15.7 K/UL (ref 4.3–11)

## 2020-10-20 RX ADMIN — HYDROCODONE BITARTRATE AND ACETAMINOPHEN PRN TAB: 10; 325 TABLET ORAL at 09:08

## 2020-10-20 RX ADMIN — OXYCODONE HYDROCHLORIDE AND ACETAMINOPHEN SCH MG: 500 TABLET ORAL at 09:32

## 2020-10-20 RX ADMIN — Medication PRN MG: at 03:36

## 2020-10-20 RX ADMIN — LEVOTHYROXINE SODIUM SCH MCG: 125 TABLET ORAL at 09:31

## 2020-10-20 RX ADMIN — TOPIRAMATE SCH MG: 100 TABLET, COATED ORAL at 09:32

## 2020-10-20 RX ADMIN — GABAPENTIN SCH MG: 400 CAPSULE ORAL at 13:23

## 2020-10-20 RX ADMIN — ACETAMINOPHEN PRN MG: 325 TABLET ORAL at 20:16

## 2020-10-20 RX ADMIN — FOLIC ACID SCH MG: 1 TABLET ORAL at 09:31

## 2020-10-20 RX ADMIN — GABAPENTIN SCH MG: 400 CAPSULE ORAL at 16:26

## 2020-10-20 RX ADMIN — GABAPENTIN SCH MG: 400 CAPSULE ORAL at 09:31

## 2020-10-20 RX ADMIN — Medication SCH OZ: at 10:00

## 2020-10-20 RX ADMIN — TOPIRAMATE SCH MG: 100 TABLET, COATED ORAL at 16:27

## 2020-10-20 RX ADMIN — PANTOPRAZOLE SODIUM SCH MG: 40 TABLET, DELAYED RELEASE ORAL at 06:35

## 2020-10-20 RX ADMIN — SODIUM CHLORIDE PRN MLS/HR: 9 INJECTION, SOLUTION INTRAVENOUS at 04:16

## 2020-10-20 RX ADMIN — Medication PRN MG: at 18:25

## 2020-10-20 RX ADMIN — DEXTROSE MONOHYDRATE SCH MLS/HR: 50 INJECTION, SOLUTION INTRAVENOUS at 14:08

## 2020-10-20 RX ADMIN — HYDROCODONE BITARTRATE AND ACETAMINOPHEN PRN TAB: 10; 325 TABLET ORAL at 16:27

## 2020-10-20 NOTE — NUR
This SW received a call from Reid Awad from the Office of Public Guardian [phone]. 
Reid Awad is patient's public guardian. Per Reid, Reid will fax information regarding 
patient guardianship to this  fax machine. SW to follow up with Charge RN regarding 
patient's public guardian information as soon as Reid Awad provides it to this SW.

## 2020-10-20 NOTE — NUR
MS RN NOTE 



ADMINISTERED PRN TYLENOL 650MG FOR PAIN 3/10. WILL CONTINUE TO MONITOR. 



PATIENT VITAL SHOW O2 SAT 91% ON ROOM AIR. WILL PLACE 02 2L/MIN VIA NASAL CANNULA. WILL 
CONTINUE TO MONITOR.

## 2020-10-20 NOTE — NUR
MS RN NOTES



PATIENT STILL COMPLAINING OF LEFT HIP PAIN, SHE SAID NORCO WORKS BETTER FOR HER THAN 
MORPHINE. FREDERICK VILA NOTIFIED AWAITING ORDERS. WILL ENDORSED ACCORDINGLY.

## 2020-10-20 NOTE — NUR
WOUND CARE CONSULT: PT ADAMANTLY REFUSED SKIN ASSESSMENT. REVIEWED CHART, NURSING 
DOCUMENTATION AND PHOTOS WHICH INDICATE DEEP TISSUE INJURIES TO SACRAL AREA, BUTTOCKS AND 
WOUNDS TO LOWER EXTREMITIES AS WELL AS LEFT HIP INCISION WITH OPEN AREA, PER NURSING STAFF, 
ALL PRESENT ON ADMISSION. RECOMMEND SURGICAL AND DPM CONSULTS. DR MCNULTY AND DR NORTON 
NOTIFIED OF CONSULT REQUESTS. RECOMMENDATIONS MADE FOR SKIN PROTECTION. DISCUSSED WITH 
NURSING STAFF. MD IN AGREEMENT WITH PLAN OF CARE. 

-------------------------------------------------------------------------------

Addendum: 10/20/20 at 0900 by TIP JOSEPH WNDNU

-------------------------------------------------------------------------------

FIRST STEP LOW AIRLOSS MATTRESS IS ON ORDER.

## 2020-10-20 NOTE — NUR
called [facility], [phone], nurse christopher, states wound on 
left hip is a pressure sore noted with pus.

## 2020-10-20 NOTE — NUR
MS RN CLOSING SHIFT NOTES



PATIENT IN BED ALERT AND ORIENTED X3. ON 2 L OXYGEN VIA NC WITH NO S/S OF ACUTE RESPIRATORY 
DISTRESS NOTED.IV TO RTAC PATENT AND INTACT WITH NO REDNESS OR SWELLING NOTED. NO SIGNS OF 
INFILTRATION.  PT REFUSED WOUND CONSULT TODAY, REFUSED LOW AIR MATRESS. PT C/O OF 9/10 PAIN 
TO LEFT HIP  AND STATES PAIN ALL OVER BODY. MORPHINE 4MG GIVEN IV,  BED IS AT LOWEST 
POSITION AND LOCKED WITH CALL LIGHT WITH IN REACH. ALL SAFETY MEASURES IN PLACE WILL ENDORSE 
TO NIGHT SHIFT.

## 2020-10-20 NOTE — NUR
MS RN OPENING SHIFT NOTES



RECEIVED PATIENT IN BED A/OX3. PT IS ON 2 L O2 VIA NC WITH NO S/S OF ACUTE RESPIRATORY 
DISTRESS NOTED. NO SOB. IV TO RT AC 18G INTACT AND INFUSING WELL. COMPLAINED OF 9/10 LEFT 
HIP PAIN, INFORMED PT NEXT DOSE NOT DUE YET. RELAXATION TECHNIQUES ENCOURAGED.  BED IS AT 
LOW  POSITION AND LOCKED WITH CALL LIGHT WITH IN REACH. SAFETY MEASURES IN PLACE. WILL 
CONTINUE TO MONITOR PT THROUGHOUT SHIFT

## 2020-10-20 NOTE — NUR
MS RN NOTES



COMPLAINED OF 9/10 LEFT HIP PAIN, MORPHINE 4MG GIVEN VIA IVP. NON-PHARMACOLOGICAL 
INTERVENTIONS PROVIDED. VITAL SIGNS WNL. WILL CONTINUE TO MONITOR.

## 2020-10-20 NOTE — NUR
MS RN OPENING NOTES



RECEIVED PATIENT FROM ER VIA [facility], ALERT AND ORIENTED X 2-3. VERBALLY RESPONSIVE AND ABLE 
TO FOLLOW DIRECTIONS. BREATHING REGULAR AND UNLABORED ON NASAL CANNULA AT 2L/MIN. RIGHT AC 
IV LINE INTACT AND INFUSING WELL WITH NO BLEEDING OR S/S OF INFILTRATION NOTED. BODY 
ASSESSMENT DONE, SEEN WITH MULTIPLE PRESSURE ULCERS ON LEFT LEG, LEFT HEEL AND SACRAL AREA. 
DEEP TISSUE INJURY NOTED ON LEFT HEEL AND RIGHT BUTTOCK. INFECTED LEFT HIP SURGICAL WOUND 
WITH MODERATE SEROSANGUINEOUS DISCHARGES. PHOTOS TAKEN ATTACHED TO CHART. WOUND CULTURE 
SPECIMENS TAKEN AND SENT TO LAB. CALLED BUT UNABLE TO SPEAK TO PATIENT CONSERVATOR ON FILE, 
PATIENT REQUESTED TO BE FULL CODE FOR NOW. DENIES SUICIDAL IDEATION AT THIS TIME. COMPLAINED 
OF 9/10 LEFT HIP PAIN, NON-PHARMACOLOGICAL INTERVENTIONS PROVIDED. BED LOW AND LOCKED ON 
SEMI FOWLERS POSITION. CALL LIGHT IN REACH. WILL CONTINUE TO MONITOR.

## 2020-10-20 NOTE — NUR
MS RN CLOSING NOTES



PATIENT IN BED ALERT AND ORIENTED X 2-3. AFEBRILE WITH NO S/S OF DISTRESS OBSERVED. RIGHT AC 
IV LINE INTACT AND INFUSING WELL. COMPLAINED OF 7/10 LEFT HIP PAIN, MORPHINE NOT DUE YET. 
NON-PHARMACOLOGICAL INTERVENTIONS PROVIDED. BED LOW AND LOCKED ON SEMI FOWLERS POSITION. 
CALL LIGHT IN REACH. WILL ENDORSE TO MORNING SHIFT FOR BRONWYN.

## 2020-10-20 NOTE — NUR
MS RN OPENING NOTE 



RECEIVED PATIENT IN BED. A/OX3. CURRENTLY ON ROOM AIR. RESPIRATIONS ARE EVEN AND UNLABORED. 
NO S/S SOB NOTED. C/O PAIN AT THIS TIME. INFORMED HER SHE JUST RECEIEVED PAIN MEDICATION. 
PATIENT IS REQUESTING TYLENOL, INFORMED HER WILL GET VITAL SIGNS PRIOR TO GIVING MEDICATION. 
IV ACCESS IN RAC#18 RUNNING NS@75ML/HR. SCHMITT CATHETER IS PRESENT, DRAINING TO GRAVITY. BED 
IS LOW AND LOCKED, HOB ELEVATED IN SEMI ALEJANDRO, SIDE RIALS UP X3. CALL LIGHT WITHIN REACH. 
WILL CONTINUE TO MONITOR.

## 2020-10-20 NOTE — NUR
SW received a copy of official guardianship paperwork from Reid Delgado from the Office of 
Public Guardian and placed it in the patient's chart.

## 2020-10-20 NOTE — NUR
IV INITIATED RAC 18G. LABS DRAWN FROM SITE. PHLEBOTOMIST AT BEDSIDE FOR 
COLLECTION. IV INTACT AND PATENT, PLACED ON SALINE LOCK

## 2020-10-20 NOTE — NUR
MS RN NOTES



PATIENT REFUSED DVT PUMP PER HER IT'S UNCOMFORTABLE. RISK AND BENEFITS EXPLAINED. CHEMICAL 
DVT PROPHYLAXIS CONTRAINDICATED FOR BLEEDING LEFT HIP OLD SURGICAL WOUND. FREDERICK VILA NOTIFIED. 

-------------------------------------------------------------------------------

Addendum: 10/20/20 at 0613 by JOANNA ROTH RN

-------------------------------------------------------------------------------

ADDENDUM: CHEMICAL DVT PROPHYLAXIS CONTRAINDICATION LOW H&H LEVEL

## 2020-10-20 NOTE — NUR
MS RN NOTE 



INFORMED DR. BAEZ THAT PATIENTS POTASSIUM IS 3.3 AND WAS NOT REPLACED DURING DAY SHIFT. MD 
TELEPHONE ORDER 40MEQ PO ONE TIME NOW. ORDER READ BACK NOTED AND CARRIED OUT. WILL CONTINUE 
TO MONITOR.

## 2020-10-21 VITALS — DIASTOLIC BLOOD PRESSURE: 57 MMHG | SYSTOLIC BLOOD PRESSURE: 111 MMHG

## 2020-10-21 VITALS — SYSTOLIC BLOOD PRESSURE: 109 MMHG | DIASTOLIC BLOOD PRESSURE: 53 MMHG

## 2020-10-21 VITALS — DIASTOLIC BLOOD PRESSURE: 54 MMHG | SYSTOLIC BLOOD PRESSURE: 108 MMHG

## 2020-10-21 LAB
BUN SERPL-MCNC: 13 MG/DL (ref 7–18)
CALCIUM SERPL-MCNC: 8.7 MG/DL (ref 8.5–10.1)
CHLORIDE SERPL-SCNC: 103 MMOL/L (ref 98–107)
CO2 SERPL-SCNC: 21 MMOL/L (ref 21–32)
CREAT SERPL-MCNC: 0.8 MG/DL (ref 0.6–1.3)
GLUCOSE SERPL-MCNC: 129 MG/DL (ref 74–106)
POTASSIUM SERPL-SCNC: 3.3 MMOL/L (ref 3.5–5.1)
SODIUM SERPL-SCNC: 135 MMOL/L (ref 136–145)

## 2020-10-21 RX ADMIN — LEVOTHYROXINE SODIUM SCH MCG: 125 TABLET ORAL at 09:04

## 2020-10-21 RX ADMIN — HYDROCODONE BITARTRATE AND ACETAMINOPHEN PRN TAB: 10; 325 TABLET ORAL at 06:56

## 2020-10-21 RX ADMIN — OXYCODONE HYDROCHLORIDE AND ACETAMINOPHEN SCH MG: 500 TABLET ORAL at 09:06

## 2020-10-21 RX ADMIN — FOLIC ACID SCH MG: 1 TABLET ORAL at 09:04

## 2020-10-21 RX ADMIN — DEXTROSE MONOHYDRATE SCH MLS/HR: 50 INJECTION, SOLUTION INTRAVENOUS at 14:56

## 2020-10-21 RX ADMIN — DEXTROSE MONOHYDRATE SCH MLS/HR: 50 INJECTION, SOLUTION INTRAVENOUS at 01:42

## 2020-10-21 RX ADMIN — DOCUSATE SODIUM SCH MG: 250 CAPSULE, LIQUID FILLED ORAL at 09:04

## 2020-10-21 RX ADMIN — PANTOPRAZOLE SODIUM SCH MG: 40 TABLET, DELAYED RELEASE ORAL at 06:55

## 2020-10-21 RX ADMIN — GABAPENTIN SCH MG: 400 CAPSULE ORAL at 13:02

## 2020-10-21 RX ADMIN — LEVOFLOXACIN SCH MLS/HR: 500 INJECTION, SOLUTION INTRAVENOUS at 02:53

## 2020-10-21 RX ADMIN — Medication SCH OZ: at 09:13

## 2020-10-21 RX ADMIN — HYDROCODONE BITARTRATE AND ACETAMINOPHEN PRN TAB: 10; 325 TABLET ORAL at 13:02

## 2020-10-21 RX ADMIN — GABAPENTIN SCH MG: 400 CAPSULE ORAL at 09:04

## 2020-10-21 RX ADMIN — SODIUM CHLORIDE PRN MLS/HR: 9 INJECTION, SOLUTION INTRAVENOUS at 04:32

## 2020-10-21 RX ADMIN — TOPIRAMATE SCH MG: 100 TABLET, COATED ORAL at 09:04

## 2020-10-21 RX ADMIN — TOPIRAMATE SCH MG: 100 TABLET, COATED ORAL at 17:15

## 2020-10-21 RX ADMIN — HYDROCODONE BITARTRATE AND ACETAMINOPHEN PRN TAB: 10; 325 TABLET ORAL at 22:00

## 2020-10-21 RX ADMIN — GABAPENTIN SCH MG: 400 CAPSULE ORAL at 17:15

## 2020-10-21 NOTE — NUR
MS RN NOTES



COMPLAINED OF 7/10 BILATERAL HIP PAIN, NORCO 10/325 GIVEN BY MOUTH. NON-PHARMACOLOGICAL 
INTERVENTIONS PROVIDED. VITAL SIGNS WNL. WILL CONTINUE TO MONITOR.

## 2020-10-21 NOTE — NUR
RN OPEN NOTES



PATIENT IS A/O X 3 WITH NO SIGNS OF ACUTE DISTRESS IN ROOM AIR. NO C/O OF PAIN AT THIS 
MOMENT. IV R AC #18 G INTACT RUNNING  AT 75 ML/HR. SCHMITT CATHETER INTACT. AS PER NIGHT SHIFT 
NURSE, PATIENT REFUSES TO HAVE THE SPECIALTY MATTRESS AND REFUSED AM LABS, WILL FOLLOW UP 
WITH PATIENT. SAFETY MEASURES ARE APPLIED, BED IS LOW AND LOCKED POSITION. SIDE RAILS UP X 2 
FOR SAFETY. CALL LIGHT WITHIN REACH. WILL CONTINUE TO MONITOR.

## 2020-10-21 NOTE — NUR
MS RN OPENING NOTES



RECEIVED PATIENT IN BED, ALERT AND ORIENTED X 3. VERBALLY RESPONSIVE AND ABLE TO FOLLOW 
DIRECTIONS. BREATHING REGULAR AND UNLABORED ON ROOM AIR. RIGHT AC G18 IV LINE INTACT AND 
PATENT, INFUSING WELL WITH NO BLEEDING OR S/S OF INFILTRATION NOTED. SCHMITT CATH INTACT 
DRAINING CATRACHITO COLORED URINE WITH MODERATE AMOUNT ON BAG. DENIES SUICIDAL IDEATION AT THIS 
TIME. COMPLAINED OF LEFT HIP PAIN, NON-PHARMACOLOGICAL INTERVENTIONS PROVIDED. BED LOW AND 
LOCKED ON SEMI FOWLERS POSITION. CALL LIGHT IN REACH. WILL CONTINUE TO MONITOR.

## 2020-10-21 NOTE — NUR
MS RN NOTE 



BP /71. 



ADMINISTERED PRN NORCO 10/325 FOR PAIN 10/10 IN BOTH HIPS AND SACRUM. WILL ENDORSE TO NEXT 
SHIFT TO REASSESS.

## 2020-10-21 NOTE — NUR
ms rn note 



informed dr. malin that patient bp is consistent 90's/ 50s, patient received ns@75ml/hr and 
bp has not increased. MD telephone order 500ml bolus of ns. order read back noted and 
carried out.

## 2020-10-21 NOTE — NUR
MS RN CLOSING NOTE 



PATIENT RESTING IN BED. A/OX3. ON ROOM AIR REFUSING TO BE ON O2 SUPP MULTIPLE TIMES 
THROUGHOUT SHIFT DESPITE EDUCATING PATIENT. NO RESP DISTRESS NOTED. C/O PAIN BUT UNABLE TO 
MANAGE TO D/T UNSAFE TO ADMINISTER D/T LOW BP, BOLUS GIVEN, BP INCREASE AND NORCO GIVEN, 
ENDORSED TO INCOMING RN TO REASSESS. IV ACCESS MAINTAINED IN RAC#18 RUNNING NS@75ML/HR. 
SCHMITT CATHETER IS PRESENT, DRAINING TO GRAVITY, OUTPUT 325. PATIENT REFUSED TURNING 
THROUGHOUT SHIFT, REFUSED AM LABS, REFUSED TO BE CLEANED, REFUSED TO PLACE SPECIALTY 
MATRESS. BED REMAINS LOW AND LOCKED, HOB ELEVATED IN SEMI ALEJANDRO, SIDE RIALS UP X3. CALL 
LIGHT WITHIN REACH. WILL ENDORSE TO NEXT SHIFT

## 2020-10-22 VITALS — SYSTOLIC BLOOD PRESSURE: 101 MMHG | DIASTOLIC BLOOD PRESSURE: 51 MMHG

## 2020-10-22 VITALS — DIASTOLIC BLOOD PRESSURE: 67 MMHG | SYSTOLIC BLOOD PRESSURE: 125 MMHG

## 2020-10-22 VITALS — SYSTOLIC BLOOD PRESSURE: 128 MMHG | DIASTOLIC BLOOD PRESSURE: 65 MMHG

## 2020-10-22 LAB
ALBUMIN SERPL BCP-MCNC: 1.7 G/DL (ref 3.4–5)
ALP SERPL-CCNC: 95 U/L (ref 46–116)
ALT SERPL W P-5'-P-CCNC: 75 U/L (ref 12–78)
AST SERPL W P-5'-P-CCNC: 50 U/L (ref 15–37)
BASOPHILS # BLD AUTO: 0.1 /CMM (ref 0–0.2)
BASOPHILS NFR BLD AUTO: 0.4 % (ref 0–2)
BILIRUB SERPL-MCNC: 0.1 MG/DL (ref 0.2–1)
BUN SERPL-MCNC: 10 MG/DL (ref 7–18)
CALCIUM SERPL-MCNC: 8.5 MG/DL (ref 8.5–10.1)
CHLORIDE SERPL-SCNC: 103 MMOL/L (ref 98–107)
CO2 SERPL-SCNC: 21 MMOL/L (ref 21–32)
CREAT SERPL-MCNC: 0.6 MG/DL (ref 0.6–1.3)
EOSINOPHIL NFR BLD AUTO: 2.3 % (ref 0–6)
EOSINOPHIL NFR BLD MANUAL: 4 % (ref 0–4)
GLUCOSE SERPL-MCNC: 91 MG/DL (ref 74–106)
HCT VFR BLD AUTO: 25 % (ref 33–45)
HGB BLD-MCNC: 7.8 G/DL (ref 11.5–14.8)
LYMPHOCYTES NFR BLD AUTO: 1.7 /CMM (ref 0.8–4.8)
LYMPHOCYTES NFR BLD AUTO: 12.7 % (ref 20–44)
LYMPHOCYTES NFR BLD MANUAL: 11 % (ref 16–48)
MAGNESIUM SERPL-MCNC: 2 MG/DL (ref 1.8–2.4)
MCHC RBC AUTO-ENTMCNC: 32 G/DL (ref 31–36)
MCV RBC AUTO: 83 FL (ref 82–100)
METAMYELOCYTES NFR BLD MANUAL: 1 % (ref 0–0)
MONOCYTES NFR BLD AUTO: 0.8 /CMM (ref 0.1–1.3)
MONOCYTES NFR BLD AUTO: 5.7 % (ref 2–12)
MONOCYTES NFR BLD MANUAL: 8 % (ref 0–11)
MYELOCYTES NFR BLD MANUAL: 7 % (ref 0–0)
NEUTROPHILS # BLD AUTO: 10.7 /CMM (ref 1.8–8.9)
NEUTROPHILS NFR BLD AUTO: 78.9 % (ref 43–81)
NEUTS BAND NFR BLD MANUAL: 3 % (ref 0–5)
NEUTS SEG NFR BLD MANUAL: 66 % (ref 42–76)
PHOSPHATE SERPL-MCNC: 3.2 MG/DL (ref 2.5–4.9)
PLATELET # BLD AUTO: 508 /CMM (ref 150–450)
POTASSIUM SERPL-SCNC: 3.2 MMOL/L (ref 3.5–5.1)
PROT SERPL-MCNC: 6.1 G/DL (ref 6.4–8.2)
RBC # BLD AUTO: 2.97 MIL/UL (ref 4–5.2)
SODIUM SERPL-SCNC: 133 MMOL/L (ref 136–145)
WBC NRBC COR # BLD AUTO: 13.5 K/UL (ref 4.3–11)

## 2020-10-22 RX ADMIN — ENOXAPARIN SODIUM SCH MG: 40 INJECTION SUBCUTANEOUS at 10:52

## 2020-10-22 RX ADMIN — Medication SCH OZ: at 09:14

## 2020-10-22 RX ADMIN — SODIUM CHLORIDE PRN MLS/HR: 9 INJECTION, SOLUTION INTRAVENOUS at 21:52

## 2020-10-22 RX ADMIN — DEXTROSE MONOHYDRATE SCH MLS/HR: 50 INJECTION, SOLUTION INTRAVENOUS at 14:19

## 2020-10-22 RX ADMIN — GABAPENTIN SCH MG: 400 CAPSULE ORAL at 16:52

## 2020-10-22 RX ADMIN — LEVOTHYROXINE SODIUM SCH MCG: 125 TABLET ORAL at 09:13

## 2020-10-22 RX ADMIN — TOPIRAMATE SCH MG: 100 TABLET, COATED ORAL at 09:13

## 2020-10-22 RX ADMIN — FOLIC ACID SCH MG: 1 TABLET ORAL at 09:13

## 2020-10-22 RX ADMIN — DOCUSATE SODIUM SCH MG: 250 CAPSULE, LIQUID FILLED ORAL at 09:13

## 2020-10-22 RX ADMIN — DEXTROSE MONOHYDRATE SCH MLS/HR: 50 INJECTION, SOLUTION INTRAVENOUS at 01:11

## 2020-10-22 RX ADMIN — TOPIRAMATE SCH MG: 100 TABLET, COATED ORAL at 16:52

## 2020-10-22 RX ADMIN — OXYCODONE HYDROCHLORIDE AND ACETAMINOPHEN SCH MG: 500 TABLET ORAL at 09:13

## 2020-10-22 RX ADMIN — GABAPENTIN SCH MG: 400 CAPSULE ORAL at 12:39

## 2020-10-22 RX ADMIN — HYDROCODONE BITARTRATE AND ACETAMINOPHEN PRN TAB: 10; 325 TABLET ORAL at 13:46

## 2020-10-22 RX ADMIN — LEVOFLOXACIN SCH MLS/HR: 500 INJECTION, SOLUTION INTRAVENOUS at 02:20

## 2020-10-22 RX ADMIN — HYDROCODONE BITARTRATE AND ACETAMINOPHEN PRN TAB: 10; 325 TABLET ORAL at 04:21

## 2020-10-22 RX ADMIN — DEXTROSE MONOHYDRATE SCH MLS/HR: 50 INJECTION, SOLUTION INTRAVENOUS at 15:50

## 2020-10-22 RX ADMIN — DOCUSATE SODIUM SCH MG: 250 CAPSULE, LIQUID FILLED ORAL at 09:00

## 2020-10-22 RX ADMIN — PANTOPRAZOLE SODIUM SCH MG: 40 TABLET, DELAYED RELEASE ORAL at 06:36

## 2020-10-22 RX ADMIN — Medication PRN MG: at 01:26

## 2020-10-22 RX ADMIN — GABAPENTIN SCH MG: 400 CAPSULE ORAL at 09:13

## 2020-10-22 NOTE — NUR
MS RN OPENING NOTES



RECEIVED PATIENT IN BED, ALERT AND ORIENTED X 3. VERBALLY RESPONSIVE AND ABLE TO FOLLOW 
DIRECTIONS. BREATHING REGULAR AND UNLABORED ON ROOM AIR. RIGHT AC G18 IV LINE INTACT AND 
PATENT, INFUSING WELL WITH NO BLEEDING OR S/S OF INFILTRATION NOTED. SCHMITT CATH INTACT 
DRAINING YELLOW CATRACHITO COLORED URINE WITH MODERATE AMOUNT ON BAG. NO COMPLAINT OF LEFT HIP 
PAIN, NON-PHARMACOLOGICAL INTERVENTIONS PROVIDED. BED LOW AND LOCKED ON SEMI FOWLERS 
POSITION. CALL LIGHT IN REACH. WILL CONTINUE TO MONITOR.

## 2020-10-22 NOTE — NUR
MS RN CLOSING NOTES



PATIENT IN BED ALERT AND ORIENTED X 3. AFEBRILE WITH NO S/S OF DISTRESS OBSERVED. RIGHT AC 
IV LINE INTACT AND INFUSING WELL. NO COMPLAINTS OF PAIN REPORTED AT THIS TIME. BED LOW AND 
LOCKED ON SEMI FOWLERS POSITION. CALL LIGHT IN REACH. WILL ENDORSE TO MORNING SHIFT FOR BRONWYN.

## 2020-10-22 NOTE — NUR
PT IS NON COMPLIANT WITH GENTLE TURNING AND REPOSITIONING INSPITE OF EXPLAINING THE RISKS 
AND BENEFITS OF ITS IMPORTANCE WITH SEVERAL ATTEMPTS. PT SCREAMS "YOU'RE ABUSING ME!!! 
SEVERAL TIMES WITH A GENTLE LIGHT LIFT. PT INSISTS TO REFUSE.

## 2020-10-22 NOTE — NUR
MS LVN INITIAL NOTES

 RECEIVED REPORT FROM AM NURSE AND SEEN PT IN BED WATCHING TV AT THIS TIME. NOT IN ANY 
DISCOMFORT , NOT IN ANY DISTRESS AS WELL. STILL WITH IVF AT NS AT 75ML/HR ON HER AC PATENT 
AND INTACT NO REDNESS NOTED. DENIES ANY PAIN . RESPIRATION EVEN AND UNLABORED. SCHMITT TO 
GRAVITY , DRESSING DRY AND INTACT. RE-ORIENTED HOW TO USED THE CALL LIGHT SYSTEM AND 
ENCOURAGE HER TO USED IF SHE NEEDS SOME HELP . KEPT HER WARM AND COMFORTABLE AT ALL TIMES. 
WILL CONTINUE MONITORING.  PLACE CALL LIGHT AT REACH.

## 2020-10-22 NOTE — NUR
PT IS NON COMPLAINT WITH GENTLE TURNING AND REPOSITIONING INSPITE OF EXPLAINING THE RISKS 
AND BENEFITS OF ITS IMPORTANCE WITH SEVERAL ATTEMPTS. REFUSED THOROUGH WOUND ASSESSMENT WITH 
FREDERICK SEPULVEDA AND DR VASQUEZ-PT INSISTS TO REFUSE.

## 2020-10-22 NOTE — NUR
MS RN CLOSING NOTES



PATIENT IN BED ALERT AND ORIENTED X 3. AFEBRILE WITH NO S/S OF DISTRESS OBSERVED. RIGHT AC 
IV LINE INTACT WITH IVF INFUSING WELL. NO COMPLAINTS OF PAIN REPORTED AT THIS TIME. BED LOW 
AND LOCKED ON SEMI FOWLERS POSITION. CALL LIGHT WITHIN REACH.

## 2020-10-23 VITALS — SYSTOLIC BLOOD PRESSURE: 115 MMHG | DIASTOLIC BLOOD PRESSURE: 61 MMHG

## 2020-10-23 VITALS — DIASTOLIC BLOOD PRESSURE: 66 MMHG | SYSTOLIC BLOOD PRESSURE: 122 MMHG

## 2020-10-23 LAB
BASOPHILS # BLD AUTO: 0.1 /CMM (ref 0–0.2)
BASOPHILS NFR BLD AUTO: 0.7 % (ref 0–2)
BUN SERPL-MCNC: 7 MG/DL (ref 7–18)
CALCIUM SERPL-MCNC: 8.7 MG/DL (ref 8.5–10.1)
CHLORIDE SERPL-SCNC: 109 MMOL/L (ref 98–107)
CO2 SERPL-SCNC: 22 MMOL/L (ref 21–32)
CREAT SERPL-MCNC: 0.6 MG/DL (ref 0.6–1.3)
EOSINOPHIL NFR BLD AUTO: 1.6 % (ref 0–6)
EOSINOPHIL NFR BLD MANUAL: 1 % (ref 0–4)
GLUCOSE SERPL-MCNC: 103 MG/DL (ref 74–106)
HCT VFR BLD AUTO: 28 % (ref 33–45)
HGB BLD-MCNC: 8.6 G/DL (ref 11.5–14.8)
LYMPHOCYTES NFR BLD AUTO: 1.7 /CMM (ref 0.8–4.8)
LYMPHOCYTES NFR BLD AUTO: 12.8 % (ref 20–44)
LYMPHOCYTES NFR BLD MANUAL: 17 % (ref 16–48)
MCHC RBC AUTO-ENTMCNC: 31 G/DL (ref 31–36)
MCV RBC AUTO: 84 FL (ref 82–100)
MONOCYTES NFR BLD AUTO: 0.7 /CMM (ref 0.1–1.3)
MONOCYTES NFR BLD AUTO: 5 % (ref 2–12)
MONOCYTES NFR BLD MANUAL: 7 % (ref 0–11)
MYELOCYTES NFR BLD MANUAL: 3 % (ref 0–0)
NEUTROPHILS # BLD AUTO: 10.9 /CMM (ref 1.8–8.9)
NEUTROPHILS NFR BLD AUTO: 79.9 % (ref 43–81)
NEUTS BAND NFR BLD MANUAL: 2 % (ref 0–5)
NEUTS SEG NFR BLD MANUAL: 70 % (ref 42–76)
PLATELET # BLD AUTO: 504 /CMM (ref 150–450)
POTASSIUM SERPL-SCNC: 3.7 MMOL/L (ref 3.5–5.1)
RBC # BLD AUTO: 3.28 MIL/UL (ref 4–5.2)
SODIUM SERPL-SCNC: 141 MMOL/L (ref 136–145)
WBC NRBC COR # BLD AUTO: 13.6 K/UL (ref 4.3–11)

## 2020-10-23 RX ADMIN — ACETAMINOPHEN PRN MG: 325 TABLET ORAL at 09:40

## 2020-10-23 RX ADMIN — TOPIRAMATE SCH MG: 100 TABLET, COATED ORAL at 09:30

## 2020-10-23 RX ADMIN — GABAPENTIN SCH MG: 400 CAPSULE ORAL at 12:54

## 2020-10-23 RX ADMIN — DEXTROSE MONOHYDRATE SCH MLS/HR: 50 INJECTION, SOLUTION INTRAVENOUS at 13:42

## 2020-10-23 RX ADMIN — PANTOPRAZOLE SODIUM SCH MG: 40 TABLET, DELAYED RELEASE ORAL at 09:29

## 2020-10-23 RX ADMIN — DOCUSATE SODIUM SCH MG: 250 CAPSULE, LIQUID FILLED ORAL at 09:29

## 2020-10-23 RX ADMIN — TOPIRAMATE SCH MG: 100 TABLET, COATED ORAL at 17:16

## 2020-10-23 RX ADMIN — HYDROCODONE BITARTRATE AND ACETAMINOPHEN PRN TAB: 10; 325 TABLET ORAL at 05:35

## 2020-10-23 RX ADMIN — DEXTROSE MONOHYDRATE SCH MLS/HR: 50 INJECTION, SOLUTION INTRAVENOUS at 02:06

## 2020-10-23 RX ADMIN — DEXTROSE MONOHYDRATE SCH MLS/HR: 50 INJECTION, SOLUTION INTRAVENOUS at 16:40

## 2020-10-23 RX ADMIN — OXYCODONE HYDROCHLORIDE AND ACETAMINOPHEN SCH MG: 500 TABLET ORAL at 09:30

## 2020-10-23 RX ADMIN — ENOXAPARIN SODIUM SCH MG: 40 INJECTION SUBCUTANEOUS at 11:00

## 2020-10-23 RX ADMIN — SODIUM CHLORIDE PRN MLS/HR: 9 INJECTION, SOLUTION INTRAVENOUS at 13:44

## 2020-10-23 RX ADMIN — Medication SCH OZ: at 09:32

## 2020-10-23 RX ADMIN — GABAPENTIN SCH MG: 400 CAPSULE ORAL at 17:15

## 2020-10-23 RX ADMIN — LEVOTHYROXINE SODIUM SCH MCG: 125 TABLET ORAL at 09:30

## 2020-10-23 RX ADMIN — FOLIC ACID SCH MG: 1 TABLET ORAL at 09:29

## 2020-10-23 RX ADMIN — GABAPENTIN SCH MG: 400 CAPSULE ORAL at 09:29

## 2020-10-23 NOTE — NUR
ms lvn notes

 pt sleeping comfortably in bed without any distress or any discomfort noted. breathing even 
and unlabored, IVF still infusing .kept her warm and comfortable at all times. will continue 
monitoring. place call light at reach.

## 2020-10-23 NOTE — NUR
Discharge Note: Patient with orders for discharge. Patient alert, awake and oriented x3. 
Patient in no s/s of distress or discomfort. Patient refusing treatment and care, non 
compliant, MD aware. Report given to MADY Meraz from [facility] at 18:25. Discharge and 
medication list explained to patient and RN. Patient unable to sign, co-signed with another 
RN, Humera. Patient with IV on right AC 16 g as ordered, intact, no s/s of infection, no 
edema, no s/s of infiltration, no redness, no c/o pain on site, tolerating well. Lopez in 
place as ordered, patent, no s/s of infection, clear, yellow, intact. Personal belongings 
sent with patient, no missing. 19:20 Transportation arrived at the hospital to  
patient, patient skin kept clean and dry, no new injury noted. No changes in LOC, no SOB 
noted, VS WNL.

## 2020-10-23 NOTE — NUR
07:30 Opening Notes: Received report from night nurse. Patient alert, awake and oriented x3. 
Patient able to make needs known. No s/s of distress or discomfort, no SOB. Patient assisted 
with breakfast, tolerated well. Patient non compliant, refuses to place HOB elevated when 
eating, drinking, and taking medications, explained risks, patient still refused. Assisted 
with ADLs and transfers, kept clean and dry. Bed locked and in lowest position. Fall 
precautions observed. Patient reminded to use call light. Call light within reach.

## 2020-10-23 NOTE — NUR
ms lvn closing notes

 pt resting after pain meds given before we cleaned the patient. not in any acute distress 
noted,. Slept well and stable throughout the night. all due meds given and all needs met IVF 
still infusing on her right AC without any redness noted. kept her warm and comfortable at 
all times. bed in low and lock in position with side rails x2 up . place call light at 
reach. will endorse to am nurse for continuity of care.

## 2020-11-22 NOTE — NUR
RN CLOSED NOTES



PATIENT IS A/O X 3 WITH NO SIGNS OF ACUTE DISTRESS IN ROOM AIR. NO C/O OF PAIN AT THIS 
MOMENT. IV R AC #18 G INTACT RUNNING  AT 75 ML/HR. SCHMITT CATHETER INTACT. PATIENT REFUSES TO 
BE REPOSITIONED DRESSING CHANGES AND HAVE THE SPECIAL MATTRESS ON . DR. JAMES AGUIRRE AWARE.  
ALL NEEDS AND TREATMENT AND MEDICATIONS ADMINISTERED AS ANTICIPATED PER ORDER. SAFETY 
MEASURES ARE APPLIED, BED IS LOW AND LOCKED POSITION. SIDE RAILS UP X 2 FOR SAFETY. CALL 
LIGHT WITHIN REACH. WILL ENDORSE TO THE NEXT NIGHT SHIFT. denies pain/discomfort

## 2020-12-10 ENCOUNTER — HOSPITAL ENCOUNTER (INPATIENT)
Dept: HOSPITAL 54 - ER | Age: 75
LOS: 7 days | Discharge: NURSING, REHAB OR LONG-TERM CARE | DRG: 871 | End: 2020-12-17
Attending: NURSE PRACTITIONER | Admitting: NURSE PRACTITIONER
Payer: MEDICARE

## 2020-12-10 VITALS — DIASTOLIC BLOOD PRESSURE: 54 MMHG | SYSTOLIC BLOOD PRESSURE: 101 MMHG

## 2020-12-10 VITALS — WEIGHT: 158 LBS | HEIGHT: 65 IN | BODY MASS INDEX: 26.33 KG/M2

## 2020-12-10 DIAGNOSIS — Z91.14: ICD-10-CM

## 2020-12-10 DIAGNOSIS — L03.115: ICD-10-CM

## 2020-12-10 DIAGNOSIS — G62.9: ICD-10-CM

## 2020-12-10 DIAGNOSIS — L85.3: ICD-10-CM

## 2020-12-10 DIAGNOSIS — F39: ICD-10-CM

## 2020-12-10 DIAGNOSIS — Z96.643: ICD-10-CM
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
## 2020-12-10 DIAGNOSIS — F10.21: ICD-10-CM

## 2020-12-10 DIAGNOSIS — F32.9: ICD-10-CM

## 2020-12-10 DIAGNOSIS — I87.2: ICD-10-CM

## 2020-12-10 DIAGNOSIS — E03.9: ICD-10-CM

## 2020-12-10 DIAGNOSIS — M81.0: ICD-10-CM

## 2020-12-10 DIAGNOSIS — L89.896: ICD-10-CM

## 2020-12-10 DIAGNOSIS — A41.59: Primary | ICD-10-CM

## 2020-12-10 DIAGNOSIS — J44.0: ICD-10-CM

## 2020-12-10 DIAGNOSIS — G89.4: ICD-10-CM

## 2020-12-10 DIAGNOSIS — D47.3: ICD-10-CM

## 2020-12-10 DIAGNOSIS — L89.316: ICD-10-CM

## 2020-12-10 DIAGNOSIS — Z80.0: ICD-10-CM

## 2020-12-10 DIAGNOSIS — M15.9: ICD-10-CM

## 2020-12-10 DIAGNOSIS — I11.0: ICD-10-CM

## 2020-12-10 DIAGNOSIS — K21.9: ICD-10-CM

## 2020-12-10 DIAGNOSIS — Z80.1: ICD-10-CM

## 2020-12-10 DIAGNOSIS — N17.0: ICD-10-CM

## 2020-12-10 DIAGNOSIS — E86.0: ICD-10-CM

## 2020-12-10 DIAGNOSIS — Z74.01: ICD-10-CM

## 2020-12-10 DIAGNOSIS — D68.59: ICD-10-CM

## 2020-12-10 DIAGNOSIS — M21.171: ICD-10-CM

## 2020-12-10 DIAGNOSIS — Z87.891: ICD-10-CM

## 2020-12-10 DIAGNOSIS — Z88.0: ICD-10-CM

## 2020-12-10 DIAGNOSIS — Z79.890: ICD-10-CM

## 2020-12-10 DIAGNOSIS — J69.0: ICD-10-CM

## 2020-12-10 DIAGNOSIS — L89.324: ICD-10-CM

## 2020-12-10 DIAGNOSIS — Z79.899: ICD-10-CM

## 2020-12-10 DIAGNOSIS — E78.1: ICD-10-CM

## 2020-12-10 DIAGNOSIS — B96.4: ICD-10-CM

## 2020-12-10 DIAGNOSIS — Z74.09: ICD-10-CM

## 2020-12-10 DIAGNOSIS — D64.9: ICD-10-CM

## 2020-12-10 DIAGNOSIS — L89.616: ICD-10-CM

## 2020-12-10 DIAGNOSIS — G40.909: ICD-10-CM

## 2020-12-10 DIAGNOSIS — Z91.19: ICD-10-CM

## 2020-12-10 DIAGNOSIS — J96.01: ICD-10-CM

## 2020-12-10 DIAGNOSIS — F41.9: ICD-10-CM

## 2020-12-10 DIAGNOSIS — F90.9: ICD-10-CM

## 2020-12-10 DIAGNOSIS — L89.154: ICD-10-CM

## 2020-12-10 DIAGNOSIS — E43: ICD-10-CM

## 2020-12-10 DIAGNOSIS — I50.22: ICD-10-CM

## 2020-12-10 DIAGNOSIS — L03.116: ICD-10-CM

## 2020-12-10 DIAGNOSIS — Z87.440: ICD-10-CM

## 2020-12-10 DIAGNOSIS — E86.1: ICD-10-CM

## 2020-12-10 DIAGNOSIS — Z99.3: ICD-10-CM

## 2020-12-10 LAB
ALBUMIN SERPL BCP-MCNC: 1.8 G/DL (ref 3.4–5)
ALP SERPL-CCNC: 138 U/L (ref 46–116)
ALT SERPL W P-5'-P-CCNC: 31 U/L (ref 12–78)
AST SERPL W P-5'-P-CCNC: 21 U/L (ref 15–37)
BASOPHILS # BLD AUTO: 0 /CMM (ref 0–0.2)
BASOPHILS NFR BLD AUTO: 0.2 % (ref 0–2)
BILIRUB DIRECT SERPL-MCNC: 0.1 MG/DL (ref 0–0.2)
BILIRUB SERPL-MCNC: 0.2 MG/DL (ref 0.2–1)
BILIRUB UR QL STRIP: NEGATIVE
BUN SERPL-MCNC: 43 MG/DL (ref 7–18)
CALCIUM SERPL-MCNC: 8.7 MG/DL (ref 8.5–10.1)
CHLORIDE SERPL-SCNC: 103 MMOL/L (ref 98–107)
CO2 SERPL-SCNC: 21 MMOL/L (ref 21–32)
COLOR UR: YELLOW
CREAT SERPL-MCNC: 1.3 MG/DL (ref 0.6–1.3)
DEPRECATED SQUAMOUS URNS QL MICRO: (no result) /HPF
EOSINOPHIL NFR BLD AUTO: 0.2 % (ref 0–6)
GLUCOSE SERPL-MCNC: 104 MG/DL (ref 74–106)
GLUCOSE UR STRIP-MCNC: NEGATIVE MG/DL
HCT VFR BLD AUTO: 26 % (ref 33–45)
HGB BLD-MCNC: 7.9 G/DL (ref 11.5–14.8)
HGB UR QL STRIP: (no result) ERY/UL
LEUKOCYTE ESTERASE UR QL STRIP: NEGATIVE
LYMPHOCYTES NFR BLD AUTO: 0.4 /CMM (ref 0.8–4.8)
LYMPHOCYTES NFR BLD AUTO: 2.3 % (ref 20–44)
LYMPHOCYTES NFR BLD MANUAL: 3 % (ref 16–48)
MCHC RBC AUTO-ENTMCNC: 31 G/DL (ref 31–36)
MCV RBC AUTO: 83 FL (ref 82–100)
MONOCYTES NFR BLD AUTO: 0.6 /CMM (ref 0.1–1.3)
MONOCYTES NFR BLD AUTO: 3.3 % (ref 2–12)
MONOCYTES NFR BLD MANUAL: 5 % (ref 0–11)
NEUTROPHILS # BLD AUTO: 17.9 /CMM (ref 1.8–8.9)
NEUTROPHILS NFR BLD AUTO: 94 % (ref 43–81)
NEUTS BAND NFR BLD MANUAL: 1 % (ref 0–5)
NEUTS SEG NFR BLD MANUAL: 91 % (ref 42–76)
NITRITE UR QL STRIP: NEGATIVE
PH UR STRIP: 5.5 [PH] (ref 5–8)
PLATELET # BLD AUTO: 514 /CMM (ref 150–450)
POTASSIUM SERPL-SCNC: 3.7 MMOL/L (ref 3.5–5.1)
PROT SERPL-MCNC: 6.9 G/DL (ref 6.4–8.2)
PROT UR QL STRIP: (no result) MG/DL
RBC # BLD AUTO: 3.07 MIL/UL (ref 4–5.2)
RBC #/AREA URNS HPF: (no result) /HPF (ref 0–2)
SODIUM SERPL-SCNC: 137 MMOL/L (ref 136–145)
UROBILINOGEN UR STRIP-MCNC: 0.2 EU/DL
WBC #/AREA URNS HPF: (no result) /HPF
WBC #/AREA URNS HPF: (no result) /HPF (ref 0–3)
WBC NRBC COR # BLD AUTO: 19.1 K/UL (ref 4.3–11)

## 2020-12-10 PROCEDURE — A6403 STERILE GAUZE>16 <= 48 SQ IN: HCPCS

## 2020-12-10 PROCEDURE — U0003 INFECTIOUS AGENT DETECTION BY NUCLEIC ACID (DNA OR RNA); SEVERE ACUTE RESPIRATORY SYNDROME CORONAVIRUS 2 (SARS-COV-2) (CORONAVIRUS DISEASE [COVID-19]), AMPLIFIED PROBE TECHNIQUE, MAKING USE OF HIGH THROUGHPUT TECHNOLOGIES AS DESCRIBED BY CMS-2020-01-R: HCPCS

## 2020-12-10 PROCEDURE — A4217 STERILE WATER/SALINE, 500 ML: HCPCS

## 2020-12-10 PROCEDURE — G0378 HOSPITAL OBSERVATION PER HR: HCPCS

## 2020-12-10 PROCEDURE — C9803 HOPD COVID-19 SPEC COLLECT: HCPCS

## 2020-12-10 PROCEDURE — A6248 HYDROGEL DRSG GEL FILLER: HCPCS

## 2020-12-10 PROCEDURE — A6253 ABSORPT DRG > 48 SQ IN W/O B: HCPCS

## 2020-12-10 RX ADMIN — ENOXAPARIN SODIUM SCH MG: 40 INJECTION SUBCUTANEOUS at 23:34

## 2020-12-10 NOTE — NUR
PT BIBPA FROM SNF FOR ABNORMAL LABS, HGB 6.9, WBC 18,  FEVER .5. PT 
AAOX2, RESPIRATIONS EVEN AND UNLABORED W/ NAD NOTED. PT CONNECTED TO THE 
CARDIAC MONITOR AND POX.

## 2020-12-11 VITALS — SYSTOLIC BLOOD PRESSURE: 109 MMHG | DIASTOLIC BLOOD PRESSURE: 53 MMHG

## 2020-12-11 VITALS — SYSTOLIC BLOOD PRESSURE: 90 MMHG | DIASTOLIC BLOOD PRESSURE: 42 MMHG

## 2020-12-11 VITALS — SYSTOLIC BLOOD PRESSURE: 91 MMHG | DIASTOLIC BLOOD PRESSURE: 44 MMHG

## 2020-12-11 VITALS — DIASTOLIC BLOOD PRESSURE: 52 MMHG | SYSTOLIC BLOOD PRESSURE: 97 MMHG

## 2020-12-11 VITALS — SYSTOLIC BLOOD PRESSURE: 101 MMHG | DIASTOLIC BLOOD PRESSURE: 43 MMHG

## 2020-12-11 LAB
ALBUMIN SERPL BCP-MCNC: 1.6 G/DL (ref 3.4–5)
ALP SERPL-CCNC: 115 U/L (ref 46–116)
ALT SERPL W P-5'-P-CCNC: 26 U/L (ref 12–78)
AST SERPL W P-5'-P-CCNC: 16 U/L (ref 15–37)
BASOPHILS # BLD AUTO: 0 /CMM (ref 0–0.2)
BASOPHILS NFR BLD AUTO: 0.2 % (ref 0–2)
BILIRUB SERPL-MCNC: 0.3 MG/DL (ref 0.2–1)
BUN SERPL-MCNC: 40 MG/DL (ref 7–18)
CALCIUM SERPL-MCNC: 8.1 MG/DL (ref 8.5–10.1)
CHLORIDE SERPL-SCNC: 103 MMOL/L (ref 98–107)
CHOLEST SERPL-MCNC: 101 MG/DL (ref ?–200)
CO2 SERPL-SCNC: 18 MMOL/L (ref 21–32)
CREAT SERPL-MCNC: 1.1 MG/DL (ref 0.6–1.3)
EOSINOPHIL NFR BLD AUTO: 0.4 % (ref 0–6)
GLUCOSE SERPL-MCNC: 100 MG/DL (ref 74–106)
HCT VFR BLD AUTO: 23 % (ref 33–45)
HDLC SERPL-MCNC: 10 MG/DL (ref 40–60)
HGB BLD-MCNC: 7 G/DL (ref 11.5–14.8)
LDLC SERPL DIRECT ASSAY-MCNC: 47 MG/DL (ref 0–99)
LYMPHOCYTES NFR BLD AUTO: 0.9 /CMM (ref 0.8–4.8)
LYMPHOCYTES NFR BLD AUTO: 4.3 % (ref 20–44)
LYMPHOCYTES NFR BLD MANUAL: 4 % (ref 16–48)
MAGNESIUM SERPL-MCNC: 2.2 MG/DL (ref 1.8–2.4)
MCHC RBC AUTO-ENTMCNC: 31 G/DL (ref 31–36)
MCV RBC AUTO: 82 FL (ref 82–100)
MONOCYTES NFR BLD AUTO: 0.9 /CMM (ref 0.1–1.3)
MONOCYTES NFR BLD AUTO: 4.4 % (ref 2–12)
MONOCYTES NFR BLD MANUAL: 3 % (ref 0–11)
NEUTROPHILS # BLD AUTO: 18.5 /CMM (ref 1.8–8.9)
NEUTROPHILS NFR BLD AUTO: 90.7 % (ref 43–81)
NEUTS BAND NFR BLD MANUAL: 2 % (ref 0–5)
NEUTS SEG NFR BLD MANUAL: 91 % (ref 42–76)
PHOSPHATE SERPL-MCNC: 4.3 MG/DL (ref 2.5–4.9)
PLATELET # BLD AUTO: 471 /CMM (ref 150–450)
POTASSIUM SERPL-SCNC: 3.6 MMOL/L (ref 3.5–5.1)
PROT SERPL-MCNC: 6.2 G/DL (ref 6.4–8.2)
RBC # BLD AUTO: 2.77 MIL/UL (ref 4–5.2)
SODIUM SERPL-SCNC: 136 MMOL/L (ref 136–145)
TRIGL SERPL-MCNC: 240 MG/DL (ref 30–150)
TSH SERPL DL<=0.005 MIU/L-ACNC: 1.44 UIU/ML (ref 0.36–3.74)
WBC NRBC COR # BLD AUTO: 20.4 K/UL (ref 4.3–11)

## 2020-12-11 RX ADMIN — GABAPENTIN SCH MG: 400 CAPSULE ORAL at 13:40

## 2020-12-11 RX ADMIN — ENOXAPARIN SODIUM SCH MG: 40 INJECTION SUBCUTANEOUS at 20:20

## 2020-12-11 RX ADMIN — DEXTROSE MONOHYDRATE SCH MLS/HR: 50 INJECTION, SOLUTION INTRAVENOUS at 13:32

## 2020-12-11 RX ADMIN — TOPIRAMATE SCH MG: 100 TABLET, COATED ORAL at 17:00

## 2020-12-11 RX ADMIN — GABAPENTIN SCH MG: 400 CAPSULE ORAL at 17:00

## 2020-12-11 RX ADMIN — SODIUM CHLORIDE PRN MLS/HR: 9 INJECTION, SOLUTION INTRAVENOUS at 15:40

## 2020-12-11 RX ADMIN — ACETAMINOPHEN PRN MG: 325 TABLET ORAL at 18:59

## 2020-12-11 RX ADMIN — TOPIRAMATE SCH MG: 100 TABLET, COATED ORAL at 10:14

## 2020-12-11 RX ADMIN — GABAPENTIN SCH MG: 400 CAPSULE ORAL at 10:14

## 2020-12-11 RX ADMIN — LEVOTHYROXINE SODIUM SCH MCG: 125 TABLET ORAL at 10:14

## 2020-12-11 RX ADMIN — SODIUM HYPOCHLORITE SCH ML: 1.25 SOLUTION TOPICAL at 17:30

## 2020-12-11 RX ADMIN — Medication PRN TAB: at 14:21

## 2020-12-11 RX ADMIN — Medication SCH GM: at 17:30

## 2020-12-11 RX ADMIN — CEFEPIME HYDROCHLORIDE SCH MLS/HR: 1 INJECTION, POWDER, FOR SOLUTION INTRAMUSCULAR; INTRAVENOUS at 23:37

## 2020-12-11 RX ADMIN — Medication PRN TAB: at 23:37

## 2020-12-11 RX ADMIN — CEFEPIME HYDROCHLORIDE SCH MLS/HR: 1 INJECTION, POWDER, FOR SOLUTION INTRAMUSCULAR; INTRAVENOUS at 12:11

## 2020-12-11 RX ADMIN — Medication PRN TAB: at 10:18

## 2020-12-11 NOTE — NUR
tele lvn: notes



received pt in bed awake, a/ox3. hob elevated. on o2 at 3l/min via n/c. afebrile. will 
continue to monitor.

## 2020-12-11 NOTE — NUR
tele lvn: notes



new wound tx received per plastic surgeon, but pt refused. instructed to call fro 
assistance. will continue to monitor.

## 2020-12-11 NOTE — NUR
tele lvn: notes



c/o unable to scale generalized discomfort, medicated with norco 1 tab po as ordered. will 
continue to monitor.

## 2020-12-11 NOTE — NUR
Patient resides at [facility] [phone], she requires 

assistant with adl's. COVID -19 test x2 negative. Current plan is to dc back to SNF 




-------------------------------------------------------------------------------

Addendum: 12/11/20 at 1643 by OLGA LIDIA SOLORZANO RN

-------------------------------------------------------------------------------

Amended: Links added.

## 2020-12-11 NOTE — NUR
tele lvn: notes



pt still refuses wound treatment to be done despite encouragement from staff and education.

## 2020-12-11 NOTE — NUR
tele lvn: notes



pt in bed with eyes close, no s/s of discomfort. no apparent distress noted. will continue 
to monitor.

## 2020-12-11 NOTE — NUR
RN notes



Admitted a 75 year old female from ER with diagnosis of Covid 19/PNA via stretcher 
accompanied by two EMT staff. No distress noted. Breathing even and unlabored. On 3lpm O2 
via nasal cannula tolerating well. Alert and oriented x 4 verbally able to communicate 
needs. Non-Ambulatory, incontinent of bowel and bladder function. Anxious, afraid and 
suspicious. Skin assessment done. Noted with multiple non healing wounds in the lower 
extremities and large full thickness loss in the sacrum and left and right buttocks. 
Complained of generalized pain. Norco given with relief. Kept clean and dry. Will endorse to 
next shift for continuity of care.

## 2020-12-11 NOTE — NUR
tele lvn: notes



pt refused am care, repositioned, and wound care treatments despite teachings provided. 
instructed to call for assistance. will continue to monitor.

## 2020-12-11 NOTE — NUR
tele lvn: notes



incontinent of bowel rendered. sacral and buttocks dressings soiled. wound care rendered. 
kept clean and dry. turned and repositioned. will continue to monitor.

## 2020-12-11 NOTE — NUR
WOUND CARE CONSULT: REVIEWED CHART, NURSING DOCUMENTATION AND PHOTOS WHICH INDICATE MULTIPLE 
WOUNDS, PRESENT ON ADMISSION INCLUDING LOWER EXTREMITIES AS WELL AS SACRUM AND BUTTOCKS. 
RECOMMEND SURGICAL AND DPM CONSULTS. DR MCNULTY AND DR NORTON NOTIFIED OF CONSULT 
REQUESTS. PT IS ON [place] [employer] LOW AIRLOSS BED. DISCUSSED SKIN PROTECTION WITH NURSING 
STAFF. MD IN AGREEMENT WITH PLAN OF CARE.

## 2020-12-12 VITALS — SYSTOLIC BLOOD PRESSURE: 100 MMHG | DIASTOLIC BLOOD PRESSURE: 47 MMHG

## 2020-12-12 VITALS — SYSTOLIC BLOOD PRESSURE: 103 MMHG | DIASTOLIC BLOOD PRESSURE: 52 MMHG

## 2020-12-12 VITALS — DIASTOLIC BLOOD PRESSURE: 54 MMHG | SYSTOLIC BLOOD PRESSURE: 101 MMHG

## 2020-12-12 LAB
BUN SERPL-MCNC: 30 MG/DL (ref 7–18)
CALCIUM SERPL-MCNC: 7.9 MG/DL (ref 8.5–10.1)
CHLORIDE SERPL-SCNC: 106 MMOL/L (ref 98–107)
CO2 SERPL-SCNC: 19 MMOL/L (ref 21–32)
CREAT SERPL-MCNC: 1 MG/DL (ref 0.6–1.3)
FERRITIN SERPL-MCNC: 869 NG/ML (ref 8–388)
GLUCOSE SERPL-MCNC: 116 MG/DL (ref 74–106)
IRON SERPL-MCNC: 9 UG/DL (ref 50–175)
POTASSIUM SERPL-SCNC: 3 MMOL/L (ref 3.5–5.1)
SODIUM SERPL-SCNC: 137 MMOL/L (ref 136–145)
TIBC SERPL-MCNC: 100 UG/DL (ref 250–450)

## 2020-12-12 RX ADMIN — DEXTROSE MONOHYDRATE SCH MLS/HR: 50 INJECTION, SOLUTION INTRAVENOUS at 13:00

## 2020-12-12 RX ADMIN — SODIUM HYPOCHLORITE SCH ML: 1.25 SOLUTION TOPICAL at 10:46

## 2020-12-12 RX ADMIN — TOPIRAMATE SCH MG: 100 TABLET, COATED ORAL at 10:35

## 2020-12-12 RX ADMIN — Medication SCH GM: at 10:46

## 2020-12-12 RX ADMIN — SODIUM CHLORIDE PRN MLS/HR: 9 INJECTION, SOLUTION INTRAVENOUS at 06:35

## 2020-12-12 RX ADMIN — POTASSIUM CHLORIDE SCH MEQ: 1.5 POWDER, FOR SOLUTION ORAL at 14:55

## 2020-12-12 RX ADMIN — ENOXAPARIN SODIUM SCH MG: 40 INJECTION SUBCUTANEOUS at 20:50

## 2020-12-12 RX ADMIN — Medication PRN TAB: at 10:34

## 2020-12-12 RX ADMIN — LEVOTHYROXINE SODIUM SCH MCG: 125 TABLET ORAL at 10:35

## 2020-12-12 RX ADMIN — POTASSIUM CHLORIDE SCH MEQ: 1.5 POWDER, FOR SOLUTION ORAL at 11:11

## 2020-12-12 RX ADMIN — MEROPENEM SCH MLS/HR: 500 INJECTION INTRAVENOUS at 20:49

## 2020-12-12 RX ADMIN — TOPIRAMATE SCH MG: 100 TABLET, COATED ORAL at 18:44

## 2020-12-12 RX ADMIN — DEXTROSE MONOHYDRATE SCH MLS/HR: 50 INJECTION, SOLUTION INTRAVENOUS at 02:35

## 2020-12-12 RX ADMIN — ACETAMINOPHEN PRN MG: 325 TABLET ORAL at 04:33

## 2020-12-12 RX ADMIN — Medication PRN TAB: at 20:59

## 2020-12-12 RX ADMIN — GABAPENTIN SCH MG: 400 CAPSULE ORAL at 18:44

## 2020-12-12 RX ADMIN — ACETAMINOPHEN PRN MG: 325 TABLET ORAL at 13:53

## 2020-12-12 RX ADMIN — GABAPENTIN SCH MG: 400 CAPSULE ORAL at 10:35

## 2020-12-12 RX ADMIN — GABAPENTIN SCH MG: 400 CAPSULE ORAL at 13:11

## 2020-12-12 RX ADMIN — CEFEPIME HYDROCHLORIDE SCH MLS/HR: 1 INJECTION, POWDER, FOR SOLUTION INTRAMUSCULAR; INTRAVENOUS at 13:11

## 2020-12-12 RX ADMIN — POTASSIUM CHLORIDE SCH MEQ: 1.5 POWDER, FOR SOLUTION ORAL at 13:11

## 2020-12-12 NOTE — NUR
RN NOTES



RECEIVED PT IN BED RESTING COMFORTABLY, IN NO SIGN OF RESPIRATORY DISTRESS. ON 3L O2 VIA NC; 
TOLERATING WELL. IV SITE PATENT AND FLUSHING WELL. WITH ONGOING IVF AS ORDERED. SAFETY 
MEASURES HAVE BEEN PROVIDED AND IMPLEMENTED. PATIENT BED ALARM IS ON. HEAD OF BED ELEVATED. 
BED IS LOCKED,  IN LOWEST POSITION AND SIDE RAILS UP. CALL LIGHT WITHIN REACH OF THE 
PATIENT. APPLICABLE ISOLATION PRECAUTIONS IN PLACE. WILL CONTINUE TO MONITOR AND REASSESS 
FOR ANY CHANGES AND WILL CARRY OUT ANY ONGOING AND ACTIVE MD ORDER.

## 2020-12-12 NOTE — NUR
RN NOTES



RECEIVED CALL FROM LAB, SPOKE WITH DAVON, HE PROVIDED + BLOOD CULTURE RESULT: GRAM NEGATIVE 
BACILLI. WILL INFORM CASSIDY JOY AND WILL SECURE FOR ANY ORDERS. CHARGE RN MADE AWARE.

## 2020-12-12 NOTE — NUR
TELE RN AM NOTES

Received pt in bed awake, a/ox3. hob elevated. on o2 at 3l/min via n/c. afebrile. ON DROPLET 
PRECAUTIONS DUE TO COVID PCR +. TURNED EVERY TWO HRS. WITH ONGOING IVF NS AT 75 ML/HR 
INFUSING WELL. will continue to monitor.CALL LIGHT PLACED WITHIN REACH.

## 2020-12-13 VITALS — SYSTOLIC BLOOD PRESSURE: 103 MMHG | DIASTOLIC BLOOD PRESSURE: 46 MMHG

## 2020-12-13 VITALS — DIASTOLIC BLOOD PRESSURE: 49 MMHG | SYSTOLIC BLOOD PRESSURE: 104 MMHG

## 2020-12-13 VITALS — SYSTOLIC BLOOD PRESSURE: 92 MMHG | DIASTOLIC BLOOD PRESSURE: 47 MMHG

## 2020-12-13 VITALS — SYSTOLIC BLOOD PRESSURE: 87 MMHG | DIASTOLIC BLOOD PRESSURE: 60 MMHG

## 2020-12-13 LAB
ALBUMIN SERPL BCP-MCNC: 1.6 G/DL (ref 3.4–5)
ALP SERPL-CCNC: 105 U/L (ref 46–116)
ALT SERPL W P-5'-P-CCNC: 44 U/L (ref 12–78)
AST SERPL W P-5'-P-CCNC: 61 U/L (ref 15–37)
BILIRUB SERPL-MCNC: 0.2 MG/DL (ref 0.2–1)
BUN SERPL-MCNC: 25 MG/DL (ref 7–18)
CALCIUM SERPL-MCNC: 7.8 MG/DL (ref 8.5–10.1)
CHLORIDE SERPL-SCNC: 108 MMOL/L (ref 98–107)
CO2 SERPL-SCNC: 20 MMOL/L (ref 21–32)
CREAT SERPL-MCNC: 0.9 MG/DL (ref 0.6–1.3)
GLUCOSE SERPL-MCNC: 102 MG/DL (ref 74–106)
MAGNESIUM SERPL-MCNC: 2 MG/DL (ref 1.8–2.4)
PHOSPHATE SERPL-MCNC: 2.3 MG/DL (ref 2.5–4.9)
POTASSIUM SERPL-SCNC: 4 MMOL/L (ref 3.5–5.1)
PROT SERPL-MCNC: 6 G/DL (ref 6.4–8.2)
SODIUM SERPL-SCNC: 138 MMOL/L (ref 136–145)

## 2020-12-13 PROCEDURE — 05HB33Z INSERTION OF INFUSION DEVICE INTO RIGHT BASILIC VEIN, PERCUTANEOUS APPROACH: ICD-10-PCS | Performed by: NURSE PRACTITIONER

## 2020-12-13 RX ADMIN — ALBUTEROL SULFATE SCH MG: 2.5 SOLUTION RESPIRATORY (INHALATION) at 19:30

## 2020-12-13 RX ADMIN — TOPIRAMATE SCH MG: 100 TABLET, COATED ORAL at 16:10

## 2020-12-13 RX ADMIN — Medication SCH MG: at 19:30

## 2020-12-13 RX ADMIN — ACETAMINOPHEN PRN MG: 325 TABLET ORAL at 16:10

## 2020-12-13 RX ADMIN — Medication PRN TAB: at 13:45

## 2020-12-13 RX ADMIN — SODIUM HYPOCHLORITE SCH ML: 1.25 SOLUTION TOPICAL at 08:54

## 2020-12-13 RX ADMIN — Medication SCH GM: at 08:55

## 2020-12-13 RX ADMIN — ENOXAPARIN SODIUM SCH MG: 40 INJECTION SUBCUTANEOUS at 22:01

## 2020-12-13 RX ADMIN — GABAPENTIN SCH MG: 400 CAPSULE ORAL at 13:44

## 2020-12-13 RX ADMIN — MEROPENEM SCH MLS/HR: 500 INJECTION INTRAVENOUS at 08:41

## 2020-12-13 RX ADMIN — SODIUM CHLORIDE SCH MLS/HR: 9 INJECTION, SOLUTION INTRAVENOUS at 14:47

## 2020-12-13 RX ADMIN — GABAPENTIN SCH MG: 400 CAPSULE ORAL at 16:10

## 2020-12-13 RX ADMIN — TOPIRAMATE SCH MG: 100 TABLET, COATED ORAL at 08:41

## 2020-12-13 RX ADMIN — LEVOTHYROXINE SODIUM SCH MCG: 125 TABLET ORAL at 08:41

## 2020-12-13 RX ADMIN — SODIUM CHLORIDE PRN MLS/HR: 9 INJECTION, SOLUTION INTRAVENOUS at 14:06

## 2020-12-13 RX ADMIN — GABAPENTIN SCH MG: 400 CAPSULE ORAL at 08:41

## 2020-12-13 RX ADMIN — Medication PRN TAB: at 05:17

## 2020-12-13 RX ADMIN — MEROPENEM SCH MLS/HR: 500 INJECTION INTRAVENOUS at 21:54

## 2020-12-13 NOTE — NUR
RN notes



Awake, comfortably resting in bed with no distress noted. Breathing even and unlabored. 
Alert and oriented. Verbally able to communicate needs. Vital signs wnl. Complained of 
bilateral lower extremities, norco given with relief. Kept clean and dry. Will endorse to 
next shift for continuity of care.

## 2020-12-13 NOTE — NUR
RN NOTES



CALLED PHARMACY S/P JEANETH  ADVISE REGARDING TROUGH LEVEL @21, VERIFIED IF NEXT DOSE SHOULD 
BE GIVEN AS SCHEDULED, DELAYED DUE TO RESULT. SHE SAID ADMINISTER BY 3AM. CHARGE RN MADE 
AWARE.

## 2020-12-13 NOTE — NUR
RN CLOSING NOTE



PATIENT CURRENTLY IN BED RESTING. ALERT/ORIENTED X3, ABLE TO MAKE NEEDS KNOWN. PATIENT 
CURRENTLY ON 2L OXYGEN THERAPY WITH NC. OXYGEN SATURATION 96%, NO S/S OF DISTRESS AT THIS 
TIME, NO PAIN REPORTED. TELE MONITOR READING SHOWS SINUS RHYTHM.  ALL SAFETY MEASURES IN 
PLACE PER HOSPITAL POLICY. BED LOCKED IN LOWEST POSITION, CALL LIGHT WITHIN REACH. WILL 
CONTINUE TO MONITOR AND PROVIDE CARE.

## 2020-12-13 NOTE — NUR
RN NOTES



ENDORSED REPORT TO MADY MADERA  FOR BRONWYN. ALL DUE MEDS PRIOR TO TIME OF ENDORSEMENT GIVEN 
CHARGE RN MADE AWARE.

## 2020-12-13 NOTE — NUR
RN OPENING NOTE



RECEIVED PATIENT IN BED RESTING ALERT ORIENTED X2-3 NO SOB NOT ACUTE DISTRESS NOTED O2:98% 
SHE IS ON 2L/OXYGEN VIA NASAL CANNULA, COVID POSITIVE MONITORING FOR CONTACT/DROPLET 
ISOLATION,NO IV SITE SHE PULLED OUT HER IV LINE,SHE HAS ORDER FOR MIDLINE,KEEP CALL LIGHT 
WITHIN REACH,BED IS IN LOW POSITION AND LOCKED,BED ALARM IS ON,CONTINUE TO MONITOR,

## 2020-12-13 NOTE — NUR
IV LINE



PATIENT ACCIDENTALLY PULLED IV LINE OUT. ATTEMPTED TO START NEW IV LINE HOWEVER WAS 
UNSUCCESSFUL. MD NOTIFIED AND RECEIVED ORDER FOR MIDLINE

## 2020-12-13 NOTE — NUR
RN NOTES



COMMUNICATED WITH CASSIDY JOY REGARDING POSITIVE BLOOD CULTURE AS GRAM NEGATIVE BACILLI. NO 
ORDERS GIVEN AT THIS TIME. CHARGE RN MADE AWARE.

## 2020-12-13 NOTE — NUR
RN OPENING NOTE



PATIENT CURRENTLY IN BED SLEEPING, EASY TO WAKEN. ALERT/ORIENTED X3, ABLE TO MAKE NEEDS 
KNOWN. PATIENT CURRENTLY ON 2L OXYGEN THERAPY WITH NC. OXYGEN SATURATION 94%, NO S/S OF 
DISTRESS AT THIS TIME, NO PAIN REPORTED. TELE MONITOR READING SHOWS SINUS RHYTHM. R WRIST 22 
G, INTACT AND PATENT, NO S/S OF INFECTION AT THIS TIME. ALL SAFETY MEASURES IN PLACE PER 
HOSPITAL POLICY. BED LOCKED IN LOWEST POSITION, CALL LIGHT WITHIN REACH. WILL CONTINUE TO 
MONITOR AND PROVIDE CARE.

## 2020-12-14 VITALS — SYSTOLIC BLOOD PRESSURE: 107 MMHG | DIASTOLIC BLOOD PRESSURE: 51 MMHG

## 2020-12-14 VITALS — DIASTOLIC BLOOD PRESSURE: 51 MMHG | SYSTOLIC BLOOD PRESSURE: 107 MMHG

## 2020-12-14 VITALS — SYSTOLIC BLOOD PRESSURE: 108 MMHG | DIASTOLIC BLOOD PRESSURE: 57 MMHG

## 2020-12-14 VITALS — DIASTOLIC BLOOD PRESSURE: 60 MMHG | SYSTOLIC BLOOD PRESSURE: 112 MMHG

## 2020-12-14 LAB
BASOPHILS # BLD AUTO: 0.1 /CMM (ref 0–0.2)
BASOPHILS NFR BLD AUTO: 0.4 % (ref 0–2)
BUN SERPL-MCNC: 20 MG/DL (ref 7–18)
CALCIUM SERPL-MCNC: 8 MG/DL (ref 8.5–10.1)
CHLORIDE SERPL-SCNC: 108 MMOL/L (ref 98–107)
CO2 SERPL-SCNC: 18 MMOL/L (ref 21–32)
CREAT SERPL-MCNC: 0.9 MG/DL (ref 0.6–1.3)
EOSINOPHIL NFR BLD AUTO: 0.9 % (ref 0–6)
GLUCOSE SERPL-MCNC: 102 MG/DL (ref 74–106)
HCT VFR BLD AUTO: 21 % (ref 33–45)
HGB BLD-MCNC: 6.4 G/DL (ref 11.5–14.8)
LYMPHOCYTES NFR BLD AUTO: 1.1 /CMM (ref 0.8–4.8)
LYMPHOCYTES NFR BLD AUTO: 7.2 % (ref 20–44)
LYMPHOCYTES NFR BLD MANUAL: 6 % (ref 16–48)
MAGNESIUM SERPL-MCNC: 2 MG/DL (ref 1.8–2.4)
MCHC RBC AUTO-ENTMCNC: 31 G/DL (ref 31–36)
MCV RBC AUTO: 82 FL (ref 82–100)
MONOCYTES NFR BLD AUTO: 0.8 /CMM (ref 0.1–1.3)
MONOCYTES NFR BLD AUTO: 5.3 % (ref 2–12)
MONOCYTES NFR BLD MANUAL: 2 % (ref 0–11)
NEUTROPHILS # BLD AUTO: 13.3 /CMM (ref 1.8–8.9)
NEUTROPHILS NFR BLD AUTO: 86.2 % (ref 43–81)
NEUTS SEG NFR BLD MANUAL: 92 % (ref 42–76)
PHOSPHATE SERPL-MCNC: 2.2 MG/DL (ref 2.5–4.9)
PLATELET # BLD AUTO: 454 /CMM (ref 150–450)
POTASSIUM SERPL-SCNC: 3.9 MMOL/L (ref 3.5–5.1)
RBC # BLD AUTO: 2.53 MIL/UL (ref 4–5.2)
SODIUM SERPL-SCNC: 140 MMOL/L (ref 136–145)
WBC NRBC COR # BLD AUTO: 15.4 K/UL (ref 4.3–11)

## 2020-12-14 PROCEDURE — 30233N1 TRANSFUSION OF NONAUTOLOGOUS RED BLOOD CELLS INTO PERIPHERAL VEIN, PERCUTANEOUS APPROACH: ICD-10-PCS | Performed by: INTERNAL MEDICINE

## 2020-12-14 RX ADMIN — GABAPENTIN SCH MG: 400 CAPSULE ORAL at 18:11

## 2020-12-14 RX ADMIN — Medication SCH GM: at 09:14

## 2020-12-14 RX ADMIN — GABAPENTIN SCH MG: 400 CAPSULE ORAL at 12:54

## 2020-12-14 RX ADMIN — DEXTROSE MONOHYDRATE SCH MLS/HR: 50 INJECTION, SOLUTION INTRAVENOUS at 13:31

## 2020-12-14 RX ADMIN — Medication SCH MG: at 07:35

## 2020-12-14 RX ADMIN — ALBUTEROL SULFATE SCH MG: 2.5 SOLUTION RESPIRATORY (INHALATION) at 19:30

## 2020-12-14 RX ADMIN — ALBUTEROL SULFATE SCH MG: 2.5 SOLUTION RESPIRATORY (INHALATION) at 13:36

## 2020-12-14 RX ADMIN — LEVOTHYROXINE SODIUM SCH MCG: 125 TABLET ORAL at 09:13

## 2020-12-14 RX ADMIN — MEROPENEM SCH MLS/HR: 500 INJECTION INTRAVENOUS at 09:14

## 2020-12-14 RX ADMIN — Medication SCH MG: at 13:35

## 2020-12-14 RX ADMIN — Medication SCH MG: at 01:30

## 2020-12-14 RX ADMIN — SODIUM HYPOCHLORITE SCH ML: 1.25 SOLUTION TOPICAL at 09:14

## 2020-12-14 RX ADMIN — ENOXAPARIN SODIUM SCH MG: 40 INJECTION SUBCUTANEOUS at 21:00

## 2020-12-14 RX ADMIN — MEROPENEM SCH MLS/HR: 500 INJECTION INTRAVENOUS at 20:52

## 2020-12-14 RX ADMIN — GABAPENTIN SCH MG: 400 CAPSULE ORAL at 09:14

## 2020-12-14 RX ADMIN — TOPIRAMATE SCH MG: 100 TABLET, COATED ORAL at 18:11

## 2020-12-14 RX ADMIN — TOPIRAMATE SCH MG: 100 TABLET, COATED ORAL at 09:14

## 2020-12-14 RX ADMIN — ALBUTEROL SULFATE SCH MG: 2.5 SOLUTION RESPIRATORY (INHALATION) at 01:30

## 2020-12-14 RX ADMIN — SODIUM CHLORIDE PRN MLS/HR: 9 INJECTION, SOLUTION INTRAVENOUS at 21:04

## 2020-12-14 RX ADMIN — ALBUTEROL SULFATE SCH MG: 2.5 SOLUTION RESPIRATORY (INHALATION) at 07:35

## 2020-12-14 RX ADMIN — Medication SCH MG: at 19:30

## 2020-12-14 RX ADMIN — SODIUM CHLORIDE SCH MLS/HR: 9 INJECTION, SOLUTION INTRAVENOUS at 14:50

## 2020-12-14 NOTE — NUR
RN CLOSING NOTE



PATIENT CURRENTLY IN BED SLEEPING, EASY TO WAKEN. ALERT/ORIENTED X3, ABLE TO MAKE NEEDS 
KNOWN. PATIENT CURRENTLY ON 2L OXYGEN THERAPY WITH NC. OXYGEN SATURATION 97%, NO S/S OF 
DISTRESS AT THIS TIME, NO PAIN REPORTED. TELE MONITOR READING SHOWS SINUS RHYTHM. R UA 
MIDLINE IV, INTACT AND PATENT, NO S/S OF INFECTION AT THIS TIME. ALL SAFETY MEASURES IN 
PLACE PER HOSPITAL POLICY. BED LOCKED IN LOWEST POSITION, CALL LIGHT WITHIN REACH. WILL 
CONTINUE TO MONITOR AND PROVIDE CARE.

## 2020-12-14 NOTE — NUR
RN OPENING NOTE



RECEIVED PATIENT IN BED RESTING ALERT ORIENTED X2-3 NO SOB NOT ACUTE DISTRESS NOTED O2:98% 
SHE IS ON 2L/OXYGEN VIA NASAL CANNULA, IV SITE IS ON RIGHT UPPER ARM MIDLINE NO SOB NOT 
ACUTE DISTRESS NOTED,ON SCHMITT CATHETER  URINE DRAINING YELLOW/CLEAR BY GRAVITY,LEFT SIDE 
CONTRACTED UPPER EXTREMITY AND LEFT LOWER EXTREMITY,BED IN LOW POSITION AND LOCKED, SAFETY 
MEASURE IMPLEMENT CONTINUE TO MONITOR

## 2020-12-14 NOTE — NUR
RN CLOSING NOTE



PATIENT REMAINS ALERT ORIENTED X2-3 VERBALLY RESPONSIVE,ON 2L OXYGEN VIA NASAL 
CANNULA,O2:97% IV SITE IS ON RIGHT UPPER ARM MIDLINE INTACT,PATENT FLUSHED,ON SCHMITT CATHETER 
URINE DRAINING BY GRAVITY YELLOW AND CLEAR,ALL DUE MEDS GIVEN AS MD ORDERED,KEPT CLEAN AND 
DRY ALL THE TIME,KEPT COMFORTABLE,ALL NEEDS MET,ENDORSE NEXT COMING SHIFT FOR CONTINUATION 
OF CARE

## 2020-12-14 NOTE — NUR
RN OPENING NOTE



PATIENT CURRENTLY IN BED SLEEPING, EASY TO WAKEN. ALERT/ORIENTED X3, ABLE TO MAKE NEEDS 
KNOWN. PATIENT CURRENTLY ON 2L OXYGEN THERAPY WITH NC. OXYGEN SATURATION 94%, NO S/S OF 
DISTRESS AT THIS TIME, NO PAIN REPORTED. TELE MONITOR READING SHOWS SINUS RHYTHM. R UA 
MIDLINE IV, INTACT AND PATENT, NO S/S OF INFECTION AT THIS TIME. ALL SAFETY MEASURES IN 
PLACE PER HOSPITAL POLICY. BED LOCKED IN LOWEST POSITION, CALL LIGHT WITHIN REACH. WILL 
CONTINUE TO MONITOR AND PROVIDE CARE.

## 2020-12-14 NOTE — NUR
MS RN NOTE

AJITH  CALLED REGARDING COVID PCR RESULT IS NEGATIVE. NURSING SUPERVISOR INFORMED. 
ALSO INFORMED NP DEVYN HARO. PER NP PLEASE WAIT FOR ID OR PULM TO DC.

## 2020-12-15 VITALS — SYSTOLIC BLOOD PRESSURE: 105 MMHG | DIASTOLIC BLOOD PRESSURE: 44 MMHG

## 2020-12-15 VITALS — DIASTOLIC BLOOD PRESSURE: 43 MMHG | SYSTOLIC BLOOD PRESSURE: 95 MMHG

## 2020-12-15 VITALS — SYSTOLIC BLOOD PRESSURE: 101 MMHG | DIASTOLIC BLOOD PRESSURE: 43 MMHG

## 2020-12-15 VITALS — SYSTOLIC BLOOD PRESSURE: 98 MMHG | DIASTOLIC BLOOD PRESSURE: 46 MMHG

## 2020-12-15 VITALS — DIASTOLIC BLOOD PRESSURE: 43 MMHG | SYSTOLIC BLOOD PRESSURE: 101 MMHG

## 2020-12-15 VITALS — DIASTOLIC BLOOD PRESSURE: 58 MMHG | SYSTOLIC BLOOD PRESSURE: 111 MMHG

## 2020-12-15 VITALS — DIASTOLIC BLOOD PRESSURE: 52 MMHG | SYSTOLIC BLOOD PRESSURE: 101 MMHG

## 2020-12-15 VITALS — SYSTOLIC BLOOD PRESSURE: 90 MMHG | DIASTOLIC BLOOD PRESSURE: 63 MMHG

## 2020-12-15 VITALS — DIASTOLIC BLOOD PRESSURE: 47 MMHG | SYSTOLIC BLOOD PRESSURE: 110 MMHG

## 2020-12-15 LAB
BASOPHILS # BLD AUTO: 0.1 /CMM (ref 0–0.2)
BASOPHILS NFR BLD AUTO: 0.4 % (ref 0–2)
BUN SERPL-MCNC: 19 MG/DL (ref 7–18)
CALCIUM SERPL-MCNC: 8.1 MG/DL (ref 8.5–10.1)
CHLORIDE SERPL-SCNC: 108 MMOL/L (ref 98–107)
CO2 SERPL-SCNC: 20 MMOL/L (ref 21–32)
CREAT SERPL-MCNC: 0.9 MG/DL (ref 0.6–1.3)
EOSINOPHIL NFR BLD AUTO: 0.8 % (ref 0–6)
GLUCOSE SERPL-MCNC: 100 MG/DL (ref 74–106)
HCT VFR BLD AUTO: 20 % (ref 33–45)
HCT VFR BLD AUTO: 23 % (ref 33–45)
HGB BLD-MCNC: 6.2 G/DL (ref 11.5–14.8)
HGB BLD-MCNC: 7.3 G/DL (ref 11.5–14.8)
LYMPHOCYTES NFR BLD AUTO: 1.4 /CMM (ref 0.8–4.8)
LYMPHOCYTES NFR BLD AUTO: 9.1 % (ref 20–44)
LYMPHOCYTES NFR BLD MANUAL: 8 % (ref 16–48)
MCHC RBC AUTO-ENTMCNC: 31 G/DL (ref 31–36)
MCV RBC AUTO: 82 FL (ref 82–100)
MONOCYTES NFR BLD AUTO: 1.1 /CMM (ref 0.1–1.3)
MONOCYTES NFR BLD AUTO: 6.8 % (ref 2–12)
MONOCYTES NFR BLD MANUAL: 5 % (ref 0–11)
NEUTROPHILS # BLD AUTO: 13 /CMM (ref 1.8–8.9)
NEUTROPHILS NFR BLD AUTO: 82.9 % (ref 43–81)
NEUTS SEG NFR BLD MANUAL: 87 % (ref 42–76)
PLATELET # BLD AUTO: 469 /CMM (ref 150–450)
POTASSIUM SERPL-SCNC: 3.9 MMOL/L (ref 3.5–5.1)
RBC # BLD AUTO: 2.43 MIL/UL (ref 4–5.2)
SODIUM SERPL-SCNC: 139 MMOL/L (ref 136–145)
WBC NRBC COR # BLD AUTO: 15.7 K/UL (ref 4.3–11)

## 2020-12-15 RX ADMIN — Medication SCH GM: at 08:17

## 2020-12-15 RX ADMIN — Medication SCH MG: at 13:45

## 2020-12-15 RX ADMIN — GABAPENTIN SCH MG: 400 CAPSULE ORAL at 17:00

## 2020-12-15 RX ADMIN — Medication SCH MG: at 07:52

## 2020-12-15 RX ADMIN — SODIUM CHLORIDE SCH MLS/HR: 9 INJECTION, SOLUTION INTRAVENOUS at 16:35

## 2020-12-15 RX ADMIN — TOPIRAMATE SCH MG: 100 TABLET, COATED ORAL at 08:16

## 2020-12-15 RX ADMIN — ALBUTEROL SULFATE SCH MG: 2.5 SOLUTION RESPIRATORY (INHALATION) at 07:52

## 2020-12-15 RX ADMIN — MEROPENEM SCH MLS/HR: 500 INJECTION INTRAVENOUS at 10:27

## 2020-12-15 RX ADMIN — ACETAMINOPHEN PRN MG: 325 TABLET ORAL at 17:25

## 2020-12-15 RX ADMIN — LEVOTHYROXINE SODIUM SCH MCG: 125 TABLET ORAL at 08:16

## 2020-12-15 RX ADMIN — SODIUM HYPOCHLORITE SCH ML: 1.25 SOLUTION TOPICAL at 08:17

## 2020-12-15 RX ADMIN — GABAPENTIN SCH MG: 400 CAPSULE ORAL at 08:16

## 2020-12-15 RX ADMIN — Medication SCH MG: at 19:40

## 2020-12-15 RX ADMIN — ALBUTEROL SULFATE SCH MG: 2.5 SOLUTION RESPIRATORY (INHALATION) at 19:40

## 2020-12-15 RX ADMIN — Medication PRN TAB: at 21:07

## 2020-12-15 RX ADMIN — MEROPENEM SCH MLS/HR: 500 INJECTION INTRAVENOUS at 21:04

## 2020-12-15 RX ADMIN — GABAPENTIN SCH MG: 400 CAPSULE ORAL at 12:51

## 2020-12-15 RX ADMIN — TOPIRAMATE SCH MG: 100 TABLET, COATED ORAL at 17:00

## 2020-12-15 RX ADMIN — ALBUTEROL SULFATE SCH MG: 2.5 SOLUTION RESPIRATORY (INHALATION) at 13:45

## 2020-12-15 RX ADMIN — Medication PRN TAB: at 12:51

## 2020-12-15 RX ADMIN — ALBUTEROL SULFATE SCH MG: 2.5 SOLUTION RESPIRATORY (INHALATION) at 01:30

## 2020-12-15 RX ADMIN — Medication SCH MG: at 01:30

## 2020-12-15 NOTE — NUR
MS/RN - Notes

Received patient from Brittany RN, A/O x 3, denies pain, currently on oxygen at 2lpm via NC, 
CHELSEA midline in place, flushing well, no s/s of infiltration, no active bleeding seen, s/p 1 
unit PRBC given. Lopez catheter in place for skin management. Patient oriented to room, no 
belongings on file. All needs attended. Will continue with current medical management.

## 2020-12-15 NOTE — NUR
RN OPENING NOTE



PATIENT IN BED RESTING ALERT ORIENTED X2-3 NO SOB NOT ACUTE DISTRESS NOTED O2:98% SHE IS ON 
2L/OXYGEN VIA NASAL CANNULA, IV SITE IS ON RIGHT UPPER ARM MIDLINE NO SOB NOT ACUTE DISTRESS 
NOTED,ON SCHMITT CATHETER  URINE DRAINING YELLOW/CLEAR BY GRAVITY,LEFT SIDE CONTRACTED UPPER 
EXTREMITY AND LEFT LOWER EXTREMITY,BED IS IN LOWEST POSITION AND LOCKED,AND SIDE RAILS ARE 
UP X2 SAFETY MEASUREMENTS ARE IMPLEMENTED PER HOSPITAL POLICY. CALL LIGHT WITHIN THE REACH. 
WILL CONTINUE TO MONITOR

## 2020-12-15 NOTE — NUR
RN CLOSING NOTE



PATIENT REMAINS ON ALERT ORIENTED 2-3 VERBALLY RESPONISVE ON 2L OXYGEN VIA NASAL CANNULA, 
O2:98%NO SOB NOT ACUTE DISTRESS NOTED. ALL DUE MEDS GIVEN AS MD ORDERED KEPT ,IV SITE IS ON 
RIGHT UPPER ARM MIDLINE INTACT PATENT,IV HYDRATION RUNNING,ENDORSE NEXT COMING SHIFT FOR 
CONTINUATION OF CARE.

## 2020-12-15 NOTE — NUR
RN OPENING NOTE



RECEIVED PATIENT IN BED RESTING ALERT ORIENTED X2-3 VERBALLY RESPONSIVE ON 2L OXYGEN VIA 
NASAL CANNULA,O2:97% LEFT SIDE BODY CONTRACTED,IV SITE IS ON RIGHT UPPER ARM MIDLINE INTACT 
PATENT ON 75CC/HR IV NS RUNNING URINE DRAINING YELLOW/CLEAR BY GRAVITY CALL LIGHT WITHIN 
REACH,SAFETY MEASURE IMPLEMENT CONTINUE TO MONITOR.

## 2020-12-15 NOTE — NUR
RN NOTE



PATIENT REFUSED TO BE CHANGED AND REPOSITION,ALSO SHE REFUSED TO HAVE WOUND TREATMENT AFTER 
EXPLAINED RISKS AND BENEFIT SHE STILL REFUSED, RESPECT TO PATIENT RIGHT CONTINUE TO MONITOR.

## 2020-12-15 NOTE — NUR
MS/RN - End of shift summary

No new events seen, remain afebrile, denies pain, not in any form of distress. IVF infusing 
well on the CHELSEA midline with no s/s of infiltration. Will endorse to night RN accordingly.

## 2020-12-16 VITALS — DIASTOLIC BLOOD PRESSURE: 60 MMHG | SYSTOLIC BLOOD PRESSURE: 108 MMHG

## 2020-12-16 VITALS — SYSTOLIC BLOOD PRESSURE: 120 MMHG | DIASTOLIC BLOOD PRESSURE: 52 MMHG

## 2020-12-16 VITALS — SYSTOLIC BLOOD PRESSURE: 123 MMHG | DIASTOLIC BLOOD PRESSURE: 61 MMHG

## 2020-12-16 VITALS — SYSTOLIC BLOOD PRESSURE: 126 MMHG | DIASTOLIC BLOOD PRESSURE: 59 MMHG

## 2020-12-16 LAB
BASOPHILS # BLD AUTO: 0 /CMM (ref 0–0.2)
BASOPHILS NFR BLD AUTO: 0.4 % (ref 0–2)
BUN SERPL-MCNC: 18 MG/DL (ref 7–18)
CALCIUM SERPL-MCNC: 7.8 MG/DL (ref 8.5–10.1)
CHLORIDE SERPL-SCNC: 109 MMOL/L (ref 98–107)
CO2 SERPL-SCNC: 21 MMOL/L (ref 21–32)
CREAT SERPL-MCNC: 0.8 MG/DL (ref 0.6–1.3)
EOSINOPHIL NFR BLD AUTO: 1.1 % (ref 0–6)
GLUCOSE SERPL-MCNC: 91 MG/DL (ref 74–106)
HCT VFR BLD AUTO: 23 % (ref 33–45)
HGB BLD-MCNC: 7.1 G/DL (ref 11.5–14.8)
LYMPHOCYTES NFR BLD AUTO: 1.2 /CMM (ref 0.8–4.8)
LYMPHOCYTES NFR BLD AUTO: 9.2 % (ref 20–44)
MCHC RBC AUTO-ENTMCNC: 32 G/DL (ref 31–36)
MCV RBC AUTO: 83 FL (ref 82–100)
MONOCYTES NFR BLD AUTO: 0.9 /CMM (ref 0.1–1.3)
MONOCYTES NFR BLD AUTO: 6.5 % (ref 2–12)
NEUTROPHILS # BLD AUTO: 11 /CMM (ref 1.8–8.9)
NEUTROPHILS NFR BLD AUTO: 82.8 % (ref 43–81)
PLATELET # BLD AUTO: 410 /CMM (ref 150–450)
POTASSIUM SERPL-SCNC: 3.8 MMOL/L (ref 3.5–5.1)
RBC # BLD AUTO: 2.69 MIL/UL (ref 4–5.2)
SODIUM SERPL-SCNC: 139 MMOL/L (ref 136–145)
WBC NRBC COR # BLD AUTO: 13.3 K/UL (ref 4.3–11)

## 2020-12-16 RX ADMIN — SODIUM CHLORIDE PRN MLS/HR: 9 INJECTION, SOLUTION INTRAVENOUS at 12:15

## 2020-12-16 RX ADMIN — DEXTROSE MONOHYDRATE SCH MLS/HR: 50 INJECTION, SOLUTION INTRAVENOUS at 00:31

## 2020-12-16 RX ADMIN — ALBUTEROL SULFATE SCH MG: 2.5 SOLUTION RESPIRATORY (INHALATION) at 13:30

## 2020-12-16 RX ADMIN — ACETAMINOPHEN PRN MG: 325 TABLET ORAL at 13:45

## 2020-12-16 RX ADMIN — GABAPENTIN SCH MG: 400 CAPSULE ORAL at 17:59

## 2020-12-16 RX ADMIN — ALBUTEROL SULFATE SCH MG: 2.5 SOLUTION RESPIRATORY (INHALATION) at 01:29

## 2020-12-16 RX ADMIN — TOPIRAMATE SCH MG: 100 TABLET, COATED ORAL at 17:59

## 2020-12-16 RX ADMIN — TOPIRAMATE SCH MG: 100 TABLET, COATED ORAL at 11:25

## 2020-12-16 RX ADMIN — GABAPENTIN SCH MG: 400 CAPSULE ORAL at 15:24

## 2020-12-16 RX ADMIN — MEROPENEM SCH MLS/HR: 500 INJECTION INTRAVENOUS at 11:24

## 2020-12-16 RX ADMIN — ACETAMINOPHEN PRN MG: 325 TABLET ORAL at 00:51

## 2020-12-16 RX ADMIN — SODIUM HYPOCHLORITE SCH ML: 1.25 SOLUTION TOPICAL at 11:40

## 2020-12-16 RX ADMIN — ALBUTEROL SULFATE SCH MG: 2.5 SOLUTION RESPIRATORY (INHALATION) at 19:30

## 2020-12-16 RX ADMIN — GABAPENTIN SCH MG: 400 CAPSULE ORAL at 11:25

## 2020-12-16 RX ADMIN — Medication SCH MG: at 19:30

## 2020-12-16 RX ADMIN — Medication SCH MG: at 13:30

## 2020-12-16 RX ADMIN — Medication PRN TAB: at 08:42

## 2020-12-16 RX ADMIN — Medication PRN TAB: at 17:59

## 2020-12-16 RX ADMIN — ALBUTEROL SULFATE SCH MG: 2.5 SOLUTION RESPIRATORY (INHALATION) at 07:35

## 2020-12-16 RX ADMIN — Medication SCH MG: at 07:35

## 2020-12-16 RX ADMIN — MEROPENEM SCH MLS/HR: 500 INJECTION INTRAVENOUS at 21:54

## 2020-12-16 RX ADMIN — LEVOTHYROXINE SODIUM SCH MCG: 125 TABLET ORAL at 08:42

## 2020-12-16 RX ADMIN — SODIUM CHLORIDE SCH MLS/HR: 9 INJECTION, SOLUTION INTRAVENOUS at 15:49

## 2020-12-16 RX ADMIN — Medication SCH MG: at 01:29

## 2020-12-16 RX ADMIN — Medication SCH GM: at 11:40

## 2020-12-16 RX ADMIN — ACETAMINOPHEN PRN MG: 325 TABLET ORAL at 22:14

## 2020-12-16 NOTE — NUR
MS RN NOTE: PRN TYLENOL GIVEN



PATIENT C/O LEFT SHOULDER & LEFT HIP PAIN 3/10 & WANTED TO TAKE TYLENOL AT THIS TIME. PRN 
TYLENOL 650 MG PO GIVEN AS ORDERED. WILL CONTINUE TO MONITOR.

## 2020-12-16 NOTE — NUR
RN CLOSING NOTE



PATIENT REMAINS ON ALERT ORIENTED X2-3 VERBALLY RESPONSIVE,ON MED SURG MONITORING NO SOB NOT 
ACUTE DISTRESS NOTED ON 3L OXYGEN VIA NASAL CANNULA O2:98% IV SITE IS ON RIGHT UPPER ARM MID 
LINE INTACT PATENT,SCHMITT IN PLACE URINE DRAINING YELLOW AND CLEAR,ALL DUE MEDS GIVEN AS MD 
ORDERED KEPT CLEAN AND DRY ALL THE TIME,KEPT COMFORTABLE,KEPT CALL LIGHT WITHIN 
REACH,ENDORSE NEXT COMING SHIFT FOR CONTINUATION OF CARE.

## 2020-12-17 VITALS — DIASTOLIC BLOOD PRESSURE: 51 MMHG | SYSTOLIC BLOOD PRESSURE: 107 MMHG

## 2020-12-17 VITALS — DIASTOLIC BLOOD PRESSURE: 52 MMHG | SYSTOLIC BLOOD PRESSURE: 99 MMHG

## 2020-12-17 LAB
BASOPHILS # BLD AUTO: 0 /CMM (ref 0–0.2)
BASOPHILS NFR BLD AUTO: 0.2 % (ref 0–2)
BUN SERPL-MCNC: 15 MG/DL (ref 7–18)
CALCIUM SERPL-MCNC: 8.1 MG/DL (ref 8.5–10.1)
CHLORIDE SERPL-SCNC: 109 MMOL/L (ref 98–107)
CO2 SERPL-SCNC: 24 MMOL/L (ref 21–32)
CREAT SERPL-MCNC: 0.8 MG/DL (ref 0.6–1.3)
EOSINOPHIL NFR BLD AUTO: 1.2 % (ref 0–6)
GLUCOSE SERPL-MCNC: 96 MG/DL (ref 74–106)
HCT VFR BLD AUTO: 23 % (ref 33–45)
HGB BLD-MCNC: 7.3 G/DL (ref 11.5–14.8)
LYMPHOCYTES NFR BLD AUTO: 0.9 /CMM (ref 0.8–4.8)
LYMPHOCYTES NFR BLD AUTO: 7.2 % (ref 20–44)
MCHC RBC AUTO-ENTMCNC: 31 G/DL (ref 31–36)
MCV RBC AUTO: 84 FL (ref 82–100)
MONOCYTES NFR BLD AUTO: 0.9 /CMM (ref 0.1–1.3)
MONOCYTES NFR BLD AUTO: 6.9 % (ref 2–12)
NEUTROPHILS # BLD AUTO: 10.6 /CMM (ref 1.8–8.9)
NEUTROPHILS NFR BLD AUTO: 84.5 % (ref 43–81)
PLATELET # BLD AUTO: 388 /CMM (ref 150–450)
POTASSIUM SERPL-SCNC: 4 MMOL/L (ref 3.5–5.1)
RBC # BLD AUTO: 2.78 MIL/UL (ref 4–5.2)
SODIUM SERPL-SCNC: 140 MMOL/L (ref 136–145)
WBC NRBC COR # BLD AUTO: 12.6 K/UL (ref 4.3–11)

## 2020-12-17 RX ADMIN — Medication PRN TAB: at 04:38

## 2020-12-17 RX ADMIN — GABAPENTIN SCH MG: 400 CAPSULE ORAL at 18:06

## 2020-12-17 RX ADMIN — TOPIRAMATE SCH MG: 100 TABLET, COATED ORAL at 18:06

## 2020-12-17 RX ADMIN — LEVOTHYROXINE SODIUM SCH MCG: 125 TABLET ORAL at 08:22

## 2020-12-17 RX ADMIN — Medication SCH MG: at 13:30

## 2020-12-17 RX ADMIN — Medication PRN TAB: at 16:10

## 2020-12-17 RX ADMIN — ALBUTEROL SULFATE SCH MG: 2.5 SOLUTION RESPIRATORY (INHALATION) at 07:35

## 2020-12-17 RX ADMIN — TOPIRAMATE SCH MG: 100 TABLET, COATED ORAL at 10:17

## 2020-12-17 RX ADMIN — Medication SCH GM: at 10:26

## 2020-12-17 RX ADMIN — MEROPENEM SCH MLS/HR: 500 INJECTION INTRAVENOUS at 10:17

## 2020-12-17 RX ADMIN — Medication SCH MG: at 01:30

## 2020-12-17 RX ADMIN — ALBUTEROL SULFATE SCH MG: 2.5 SOLUTION RESPIRATORY (INHALATION) at 01:30

## 2020-12-17 RX ADMIN — SODIUM CHLORIDE SCH MLS/HR: 9 INJECTION, SOLUTION INTRAVENOUS at 15:27

## 2020-12-17 RX ADMIN — GABAPENTIN SCH MG: 400 CAPSULE ORAL at 10:17

## 2020-12-17 RX ADMIN — SODIUM HYPOCHLORITE SCH ML: 1.25 SOLUTION TOPICAL at 10:27

## 2020-12-17 RX ADMIN — ALBUTEROL SULFATE SCH MG: 2.5 SOLUTION RESPIRATORY (INHALATION) at 13:30

## 2020-12-17 RX ADMIN — Medication SCH MG: at 07:35

## 2020-12-17 RX ADMIN — GABAPENTIN SCH MG: 400 CAPSULE ORAL at 13:15

## 2020-12-17 RX ADMIN — DEXTROSE MONOHYDRATE SCH MLS/HR: 50 INJECTION, SOLUTION INTRAVENOUS at 13:24

## 2020-12-17 RX ADMIN — SODIUM CHLORIDE PRN MLS/HR: 9 INJECTION, SOLUTION INTRAVENOUS at 01:56
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
## 2020-12-17 NOTE — NUR
ms rn note: generalized pain



PATIENT C/O GENERALIZED BODY PAIN 7/10, WANTED TO TAKE NORCO ONLY AT THIS TIME. VITALS ARE 
132/65, 93, 20, 98.6, 95%, on O2 @ 2 LPM VIA NC. PRN NORCO 5-325 MG 1 TAB PO GIVEN. WILL 
REASSESS FOR EFFECTIVENESS.

## 2020-12-17 NOTE — NUR
RN -NOTES

RECEIVED PATIENT SLEEPING IN BED,EASILY AROUSED  NO ACUTE DISTRESS  NOTED.  IV FLUID OF NS 
AT 75ML/HR  INFUSING WELL. WILL CONT. MONITORING .

## 2020-12-17 NOTE — NUR
MS RN NOTE



PATIENT REFUSED TO CHANGE LEFT LEG SOILED DRESSING DESPITE OF RISKS & BENEFITS EXPLANATIONS 
X3. PATIENT IS UNCOOPERATIVE WITH SACRAL WOUND DRESSING, PAIN MEDICATION WAS GIVEN PRIOR TO 
DRESSING CHANGE BUT PATIENT ONLY ALLOWED TO CHANGE SACRAL DRESSING, NOT ANY OTHER DRESSING 
AT NIGHT SHIFT. WILL ENDORSE TO AM RN FOR CONTINUITY OF CARE.

## 2020-12-17 NOTE — NUR
MS RN CLOSING NOTE



PATIENT SLEPT INTERMITTENTLY AT NIGHT SHIFT, ALERT ORIENTED X 2-3 VERBALLY RESPONSIVE ON 2L 
OXYGEN VIA NASAL CANULA, LEFT SIDE BODY CONTRACTED, IV SITE IS ON RIGHT UPPER ARM MIDLINE 
INTACT PATENT ON 75CC/HR IV NS RUNNING. URINE DRAINING YELLOW/CLEAR BY GRAVITY 1200 ML 
OUTPUT. UNCOOPERATIVE WITH ADL CARE AT TIMES, REFUSES CARE. BED IN LOW LOCKED POSITION. CALL 
LIGHT WITHIN REACH, SAFETY MEASURE IMPLEMENTED. WILL ENDORSE TO AM RN FOR CONTINUITY OF 
CARE.

## 2020-12-23 NOTE — NUR
CINDY received a missed call from Nora Thompson [phone]. CINDY called back and left 
voicemail with SW call back number. CINDY will be available as needed. 

-------------------------------------------------------------------------------

Addendum: 12/23/20 at 1233 by SAUL WHITE

-------------------------------------------------------------------------------

CINDY received a call back from Nora Thompson [phone] who stated that they received 
the  from [employer] and  was sent to the licensing agency. Noted. Nora Thompson 
appreciated the follow up. CINDY will be available as needed.